# Patient Record
Sex: FEMALE | Race: WHITE | Employment: UNEMPLOYED | URBAN - METROPOLITAN AREA
[De-identification: names, ages, dates, MRNs, and addresses within clinical notes are randomized per-mention and may not be internally consistent; named-entity substitution may affect disease eponyms.]

---

## 2017-05-13 ENCOUNTER — HOSPITAL ENCOUNTER (EMERGENCY)
Age: 32
Discharge: HOME | End: 2017-05-14
Attending: EMERGENCY MEDICINE | Admitting: EMERGENCY MEDICINE
Payer: OTHER GOVERNMENT

## 2017-05-13 DIAGNOSIS — M79.18 LUMBAR MUSCLE PAIN: Primary | ICD-10-CM

## 2017-05-13 PROCEDURE — 96372 THER/PROPH/DIAG INJ SC/IM: CPT

## 2017-05-13 PROCEDURE — 99283 EMERGENCY DEPT VISIT LOW MDM: CPT

## 2017-05-14 ENCOUNTER — APPOINTMENT (OUTPATIENT)
Dept: GENERAL RADIOLOGY | Age: 32
End: 2017-05-14
Attending: EMERGENCY MEDICINE
Payer: OTHER GOVERNMENT

## 2017-05-14 VITALS
TEMPERATURE: 99 F | SYSTOLIC BLOOD PRESSURE: 125 MMHG | OXYGEN SATURATION: 99 % | RESPIRATION RATE: 14 BRPM | WEIGHT: 135 LBS | DIASTOLIC BLOOD PRESSURE: 75 MMHG | HEART RATE: 88 BPM

## 2017-05-14 LAB
ANION GAP BLD CALC-SCNC: 6 MMOL/L (ref 3–18)
BASOPHILS # BLD AUTO: 0 K/UL (ref 0–0.1)
BASOPHILS # BLD: 0 % (ref 0–2)
BUN SERPL-MCNC: 7 MG/DL (ref 7–18)
BUN/CREAT SERPL: 11 (ref 12–20)
CALCIUM SERPL-MCNC: 8.7 MG/DL (ref 8.5–10.1)
CHLORIDE SERPL-SCNC: 106 MMOL/L (ref 100–108)
CO2 SERPL-SCNC: 29 MMOL/L (ref 21–32)
CREAT SERPL-MCNC: 0.64 MG/DL (ref 0.6–1.3)
DIFFERENTIAL METHOD BLD: ABNORMAL
EOSINOPHIL # BLD: 0.1 K/UL (ref 0–0.4)
EOSINOPHIL NFR BLD: 1 % (ref 0–5)
ERYTHROCYTE [DISTWIDTH] IN BLOOD BY AUTOMATED COUNT: 12.3 % (ref 11.6–14.5)
GLUCOSE SERPL-MCNC: 100 MG/DL (ref 74–99)
HCT VFR BLD AUTO: 37.5 % (ref 35–45)
HGB BLD-MCNC: 12.8 G/DL (ref 12–16)
LYMPHOCYTES # BLD AUTO: 13 % (ref 21–52)
LYMPHOCYTES # BLD: 1.4 K/UL (ref 0.9–3.6)
MCH RBC QN AUTO: 30.9 PG (ref 24–34)
MCHC RBC AUTO-ENTMCNC: 34.1 G/DL (ref 31–37)
MCV RBC AUTO: 90.6 FL (ref 74–97)
MONOCYTES # BLD: 0.6 K/UL (ref 0.05–1.2)
MONOCYTES NFR BLD AUTO: 5 % (ref 3–10)
NEUTS SEG # BLD: 9.2 K/UL (ref 1.8–8)
NEUTS SEG NFR BLD AUTO: 81 % (ref 40–73)
PLATELET # BLD AUTO: 197 K/UL (ref 135–420)
PMV BLD AUTO: 11.6 FL (ref 9.2–11.8)
POTASSIUM SERPL-SCNC: 3.7 MMOL/L (ref 3.5–5.5)
RBC # BLD AUTO: 4.14 M/UL (ref 4.2–5.3)
SODIUM SERPL-SCNC: 141 MMOL/L (ref 136–145)
WBC # BLD AUTO: 11.3 K/UL (ref 4.6–13.2)

## 2017-05-14 PROCEDURE — 74011250637 HC RX REV CODE- 250/637: Performed by: EMERGENCY MEDICINE

## 2017-05-14 PROCEDURE — 74011250637 HC RX REV CODE- 250/637

## 2017-05-14 PROCEDURE — 85025 COMPLETE CBC W/AUTO DIFF WBC: CPT | Performed by: EMERGENCY MEDICINE

## 2017-05-14 PROCEDURE — 80048 BASIC METABOLIC PNL TOTAL CA: CPT | Performed by: EMERGENCY MEDICINE

## 2017-05-14 PROCEDURE — 72100 X-RAY EXAM L-S SPINE 2/3 VWS: CPT

## 2017-05-14 PROCEDURE — 74011250636 HC RX REV CODE- 250/636: Performed by: EMERGENCY MEDICINE

## 2017-05-14 RX ORDER — KETOROLAC TROMETHAMINE 30 MG/ML
30 INJECTION, SOLUTION INTRAMUSCULAR; INTRAVENOUS ONCE
Status: COMPLETED | OUTPATIENT
Start: 2017-05-14 | End: 2017-05-14

## 2017-05-14 RX ORDER — IBUPROFEN 800 MG/1
800 TABLET ORAL
Qty: 20 TAB | Refills: 0 | Status: SHIPPED | OUTPATIENT
Start: 2017-05-14 | End: 2017-05-21

## 2017-05-14 RX ORDER — METHOCARBAMOL 500 MG/1
TABLET, FILM COATED ORAL
Status: COMPLETED
Start: 2017-05-14 | End: 2017-05-14

## 2017-05-14 RX ORDER — METHOCARBAMOL 500 MG/1
750 TABLET, FILM COATED ORAL 4 TIMES DAILY
Status: DISCONTINUED | OUTPATIENT
Start: 2017-05-14 | End: 2017-05-14 | Stop reason: HOSPADM

## 2017-05-14 RX ORDER — METHOCARBAMOL 750 MG/1
750 TABLET, FILM COATED ORAL 3 TIMES DAILY
Qty: 15 TAB | Refills: 0 | Status: SHIPPED | OUTPATIENT
Start: 2017-05-14 | End: 2022-06-10

## 2017-05-14 RX ADMIN — KETOROLAC TROMETHAMINE 30 MG: 30 INJECTION, SOLUTION INTRAMUSCULAR at 00:49

## 2017-05-14 RX ADMIN — METHOCARBAMOL 750 MG: 500 TABLET ORAL at 02:07

## 2017-05-14 RX ADMIN — METHOCARBAMOL 750 MG: 500 TABLET ORAL at 02:06

## 2017-05-14 NOTE — ED TRIAGE NOTES
Pt states she was walking then all of a sudden she could not move.   Pt states severe lower back apin

## 2017-05-14 NOTE — ED PROVIDER NOTES
HPI Comments: 12:07 AM Rosalio Roman is a 36 y.o. female, who presents to the ED for the evaluation of back pain, onset around 0745 yesterday morning. States that while walking around her house, her back spasmed, followed by pain. Pt states that movement exacerbates her back pain. Reports h/o similar sx, but denies evaluation. Denies fever or chills. Reports taking Ibuprofen without relief. Denies smoking. No other complaints at this time. PCP: Not On File Bsi     The history is provided by the patient. History reviewed. No pertinent past medical history. History reviewed. No pertinent surgical history. History reviewed. No pertinent family history. Social History     Social History    Marital status: SINGLE     Spouse name: N/A    Number of children: N/A    Years of education: N/A     Occupational History    Not on file. Social History Main Topics    Smoking status: Former Smoker    Smokeless tobacco: Not on file    Alcohol use Not on file    Drug use: Not on file    Sexual activity: Not on file     Other Topics Concern    Not on file     Social History Narrative         ALLERGIES: Sudafed [pseudoephedrine hcl]    Review of Systems   Constitutional: Negative for chills and fever. HENT: Negative for congestion. Respiratory: Negative for cough and shortness of breath. Cardiovascular: Negative for chest pain. Gastrointestinal: Negative for abdominal pain and vomiting. Musculoskeletal: Positive for back pain. Skin: Negative for rash. Neurological: Negative for light-headedness. All other systems reviewed and are negative. Vitals:    05/13/17 2224   BP: 121/78   Pulse: 84   Resp: 16   Temp: 99.5 °F (37.5 °C)   SpO2: 100%   Weight: 61.2 kg (135 lb)        100% on RA, indicating adequate oxygenation. Physical Exam   Constitutional: She is oriented to person, place, and time. She appears well-developed and well-nourished.    HENT:   Head: Normocephalic and atraumatic. Right Ear: External ear normal.   Left Ear: External ear normal.   Nose: Nose normal.   Mouth/Throat: Oropharynx is clear and moist.   Eyes: Conjunctivae and EOM are normal. Pupils are equal, round, and reactive to light. Neck: Normal range of motion. Neck supple. Cardiovascular: Regular rhythm, normal heart sounds and intact distal pulses. Pulmonary/Chest: Effort normal and breath sounds normal. No respiratory distress. She has no wheezes. She has no rales. She exhibits no tenderness. Abdominal: Soft. Bowel sounds are normal. She exhibits no distension and no mass. There is no tenderness. There is no rebound and no guarding. Musculoskeletal: Normal range of motion. She exhibits no edema. Lumbar back: She exhibits tenderness. Tenderness around L5, aggravated by flexion and rotation   Neurological: She is alert and oriented to person, place, and time. Skin: Skin is warm and dry. No rash noted. No erythema. Psychiatric: She has a normal mood and affect. Her behavior is normal. Judgment normal.   Nursing note and vitals reviewed.        MDM  Number of Diagnoses or Management Options  Lumbar muscle pain:   Diagnosis management comments: Sudden low back pain aggravated by movement; suspect musculoskeletal origin Will treat , image, trend labs       Amount and/or Complexity of Data Reviewed  Clinical lab tests: ordered and reviewed  Tests in the radiology section of CPT®: ordered and reviewed    Risk of Complications, Morbidity, and/or Mortality  Presenting problems: low      ED Course       Procedures    Medications ordered:   Medications   ketorolac tromethamine (TORADOL) 60 mg/2 mL injection 30 mg (30 mg IntraMUSCular Given 5/14/17 0049)         Lab findings:  Recent Results (from the past 12 hour(s))   CBC WITH AUTOMATED DIFF    Collection Time: 05/14/17 12:30 AM   Result Value Ref Range    WBC 11.3 4.6 - 13.2 K/uL    RBC 4.14 (L) 4.20 - 5.30 M/uL    HGB 12.8 12.0 - 16.0 g/dL HCT 37.5 35.0 - 45.0 %    MCV 90.6 74.0 - 97.0 FL    MCH 30.9 24.0 - 34.0 PG    MCHC 34.1 31.0 - 37.0 g/dL    RDW 12.3 11.6 - 14.5 %    PLATELET 223 307 - 395 K/uL    MPV 11.6 9.2 - 11.8 FL    NEUTROPHILS 81 (H) 40 - 73 %    LYMPHOCYTES 13 (L) 21 - 52 %    MONOCYTES 5 3 - 10 %    EOSINOPHILS 1 0 - 5 %    BASOPHILS 0 0 - 2 %    ABS. NEUTROPHILS 9.2 (H) 1.8 - 8.0 K/UL    ABS. LYMPHOCYTES 1.4 0.9 - 3.6 K/UL    ABS. MONOCYTES 0.6 0.05 - 1.2 K/UL    ABS. EOSINOPHILS 0.1 0.0 - 0.4 K/UL    ABS. BASOPHILS 0.0 0.0 - 0.1 K/UL    DF AUTOMATED     METABOLIC PANEL, BASIC    Collection Time: 05/14/17 12:30 AM   Result Value Ref Range    Sodium 141 136 - 145 mmol/L    Potassium 3.7 3.5 - 5.5 mmol/L    Chloride 106 100 - 108 mmol/L    CO2 29 21 - 32 mmol/L    Anion gap 6 3.0 - 18 mmol/L    Glucose 100 (H) 74 - 99 mg/dL    BUN 7 7.0 - 18 MG/DL    Creatinine 0.64 0.6 - 1.3 MG/DL    BUN/Creatinine ratio 11 (L) 12 - 20      GFR est AA >60 >60 ml/min/1.73m2    GFR est non-AA >60 >60 ml/min/1.73m2    Calcium 8.7 8.5 - 10.1 MG/DL       X-Ray, CT or other radiology findings or impressions:  No results found. Spine x ray per Elyssa Eugene MD : No acute process     Progress notes, Consult notes or additional Procedure notes:   1:39 AM: I have reassessed the patient and discussed their results and diagnosis. Pt will be discharged in stable condition. Patient understands and verbalizes agreement with plan. Reevaluation of patient:   I have reevaluated patient. Patient is feeling better. Dispo:  Patient was discharged home in stable condition. Patient is to return to emergency department with any new or worsening condition. 1. Lumbar muscle pain        Follow-up Information     Follow up With Details Comments Contact Info    Not On File Bsi Call in 2 days  Not On File (58) Patient has a PCP but that physician is not listed in [address] [place] [address].       [facility] EMERGENCY DEPT  As needed, If symptoms worsen [address] [facility] [address]  [address] [facility] Call in 2 days  [facility] [address]  [phone]          Patient's Medications   Start Taking    IBUPROFEN (MOTRIN) 800 MG TABLET    Take 1 Tab by mouth every eight (8) hours as needed for Pain for up to 7 days. METHOCARBAMOL (ROBAXIN-750) 750 MG TABLET    Take 1 Tab by mouth three (3) times daily. Continue Taking    No medications on file   These Medications have changed    No medications on file   Stop Taking    No medications on file       SCRIBE ATTESTATION STATEMENT  Documented by: Sherren Erb. [employer] for, and in the presence of, Daniel Powell MD 12:07 AM     Signed by: Sherren Erb. [address], 5/14/2017 12:07 AM     PROVIDER ATTESTATION STATEMENT  I personally performed the services described in the documentation, reviewed the documentation, as recorded by the scribe in my presence, and it accurately and completely records my words and actions.   Daniel Powell MD

## 2017-05-14 NOTE — ED NOTES
Pt states lower back pain started suddenly when she was walking around the house, states she became unable to walk and \"her back seized up\", pt had this about 7 months ago after birth of child but only lasted an hour, this time it has not improved. Slight TTP on vertebrae and left lower back, denies pain down the back of the leg or tingling. Pt denies any other symptoms. Pt has no medical problems except seasonal allergies, taes nasal spray, otherwise no meds, no recent illnesses or fevers. Pt tried ibuprofin at home with no relief.  No issues with voiding or stool

## 2017-05-16 ENCOUNTER — HOSPITAL ENCOUNTER (OUTPATIENT)
Dept: GENERAL RADIOLOGY | Age: 32
Discharge: HOME | End: 2017-05-16
Payer: SELF-PAY

## 2017-05-16 DIAGNOSIS — J40 BRONCHITIS: ICD-10-CM

## 2017-05-16 PROCEDURE — 71020 XR CHEST PA LAT: CPT

## 2017-06-08 ENCOUNTER — OFFICE VISIT (OUTPATIENT)
Dept: ORTHOPEDIC SURGERY | Age: 32
End: 2017-06-08

## 2017-06-08 VITALS
TEMPERATURE: 98.2 F | WEIGHT: 137 LBS | OXYGEN SATURATION: 98 % | HEART RATE: 68 BPM | SYSTOLIC BLOOD PRESSURE: 102 MMHG | DIASTOLIC BLOOD PRESSURE: 72 MMHG | RESPIRATION RATE: 18 BRPM

## 2017-06-08 DIAGNOSIS — M62.830 LUMBAR PARASPINAL MUSCLE SPASM: Primary | ICD-10-CM

## 2017-06-08 RX ORDER — GABAPENTIN 100 MG/1
100 CAPSULE ORAL
Qty: 90 CAP | Refills: 1 | Status: SHIPPED | OUTPATIENT
Start: 2017-06-08 | End: 2022-06-10

## 2017-06-08 RX ORDER — FLUTICASONE PROPIONATE 50 MCG
2 SPRAY, SUSPENSION (ML) NASAL DAILY
COMMUNITY
Start: 2017-05-12 | End: 2022-06-10

## 2017-06-08 RX ORDER — LORATADINE 10 MG/1
10 TABLET ORAL
COMMUNITY
Start: 2017-05-16 | End: 2022-06-10

## 2017-06-08 RX ORDER — ALBUTEROL SULFATE 90 UG/1
POWDER, METERED RESPIRATORY (INHALATION)
COMMUNITY
Start: 2017-05-16 | End: 2022-06-10

## 2017-06-08 NOTE — PROGRESS NOTES
José Miguelsherlynfausto Michellegermaine [facility] 2.  [address], [address]  [facility], [address]  Phone: [phone]  Fax: [phone]        Sultana Martinez  : 1985  PCP: Leola Narayan MD      NEW PATIENT      Tony Perez 89 was seen today for back pain. Diagnoses and all orders for this visit:    Lumbar paraspinal muscle spasm    Other orders  -     gabapentin (NEURONTIN) 100 mg capsule; Take 1 Cap by mouth three (3) times daily as needed. 1. Trial of Gabapentin. 2. Referral to physical therapy. 3. Advised to avoid repetitive bending, twisting, and lifting. 4. Given care instructions for injury prevention. Follow-up Disposition:  Return in about 6 weeks (around 2017) for PT f/u, med f/u, adjustment. CHIEF COMPLAINT  Libertad Alejandre is seen today in consultation as a self referral for complaints of back pain since 17. HISTORY OF PRESENT ILLNESS  Libertad Alejandre is a 36 y.o. female  approx 9 month post-partum. Pt reports that she was walking into the kitchen when her back locked up on her. The first time she felt this in her back was after her  in 2016 when she was sitting on the couch and her back locked up on her. She notes that she went to the ED as she wanted to know what is going on in her back to cause this flared pain. She notes that she has \"crunching\" in her back as well. No rashes or infections. She denies any radiating pain or paresthesia in her BLE. She does have swelling in her back intermittently. She states that 17 her pain was so severe that she was unable to walk. She was given Robaxin and Ibuprofen from the ED which did not give her any benefit. She notes that with prolonged standing and activity her back will begin to bother her. Five days ago she had increased swelling in her back that caused her to be unable to walk or stand. She had to call her mother to help her.  Her  is in [place] for school right now so she has to rely on her mother if she is unable to do activities due to her back. Pt states that for the most part, she sleeps well at night. No saddle paresthesia. Denies persistent fevers, chills, weight changes, neurogenic bowel or bladder symptoms. Pt denies recent hospitalizations. She last worked in 2017. Pt has a baby boy, Jessika Nguyen, 6 months old at home. No family hx of spinal maladies. Xrays reviewed w/pt. - loss of normal lordosis. No flowsheet data found. XR Results:    Results from Hospital Encounter encounter on 17   XR SPINE LUMB 2 OR 3 V   Narrative LUMBAR SPINE AP AND LATERAL    CPT CODE: 07459    INDICATIONS: Pain. Findings:  AP and lateral views of the lumbar spine are submitted. Vertebral  body heights and disc spaces are well-maintained. There is no fracture or  subluxation. Pedicles are normal.         Impression Impression:    Normal study                  PAST MEDICAL HISTORY   Past Medical History:   Diagnosis Date    Asthma        Past Surgical History:   Procedure Laterality Date    HX  SECTION  2016       MEDICATIONS      Current Outpatient Prescriptions   Medication Sig Dispense Refill    PROAIR RESPICLICK 90 mcg/actuation aepb       fluticasone (FLONASE) 50 mcg/actuation nasal spray 2 Sprays by Both Nostrils route daily.  loratadine (CLARITIN) 10 mg tablet Take 10 mg by mouth.  gabapentin (NEURONTIN) 100 mg capsule Take 1 Cap by mouth three (3) times daily as needed. 90 Cap 1    methocarbamol (ROBAXIN-750) 750 mg tablet Take 1 Tab by mouth three (3) times daily. 15 Tab 0       ALLERGIES    Allergies   Allergen Reactions    Sudafed [Pseudoephedrine Hcl] Hives          SOCIAL HISTORY    Social History     Social History    Marital status:      Spouse name: N/A    Number of children: N/A    Years of education: N/A     Occupational History    Not on file.      Social History Main Topics    Smoking status: Former Smoker    Smokeless tobacco: Not on file    Alcohol use Yes    Drug use: Not on file    Sexual activity: Not on file     Other Topics Concern    Not on file     Social History Narrative       FAMILY HISTORY    Family History   Problem Relation Age of Onset    Hypertension Mother     Hypertension Father          REVIEW OF SYSTEMS  Review of Systems   Constitutional: Negative for chills, fever and weight loss. Respiratory: Negative for shortness of breath. Cardiovascular: Negative for chest pain. Gastrointestinal: Negative for constipation. Negative for fecal incontinence   Genitourinary: Negative for dysuria. Negative for urinary incontinence   Musculoskeletal:        Per HPI   Skin: Negative for rash. Neurological: Negative for dizziness, tingling, tremors, focal weakness and headaches. Endo/Heme/Allergies: Does not bruise/bleed easily. Psychiatric/Behavioral: The patient does not have insomnia. PHYSICAL EXAMINATION  Visit Vitals    /72    Pulse 68    Temp 98.2 °F (36.8 °C) (Oral)    Resp 18    Wt 137 lb (62.1 kg)    LMP 04/26/2017    SpO2 98%          Accompanied by self. Constitutional:  Well developed, well nourished, in no acute distress. Psychiatric: Affect and mood are appropriate. Integumentary: No rashes or abrasions noted on exposed areas. Cardiovascular/Peripheral Vascular: Intact l pulses. No peripheral edema is noted. Lymphatic:  No evidence of lymphedema. No cervical lymphadenopathy. SPINE/MUSCULOSKELETAL EXAM     Elevation of RT scapula. Lumbar spine:  No rash, ecchymosis, or gross obliquity. No fasciculations. No focal atrophy is noted. Tenderness to palpation of bilateral paraspinals L4 to the sacrum. No tenderness to palpation at the sciatic notch. SI joints non-tender. Trochanters non tender. Sensation grossly intact to light touch.       MOTOR:       Hip Flex  Quads Hamstrings Ankle DF EHL Ankle PF   Right +4/5 +4/5 +4/5 +4/5 +4/5 +4/5   Left +4/5 +4/5 +4/5 +4/5 +4/5 +4/5     Straight Leg raise negative. No difficulty with tandem gait. Heel walk intact. Toe rise intact. Squat elicits back pain. Ambulation without assistive device. FWB. DTRs 2+ LEs. Written by Deanna Morgan, as dictated by Monserrat Rosa MD.    I, Dr. Monserrat Rosa MD, confirm that all documentation is accurate. Ms. Mikhail Huffman may have a reminder for a \"due or due soon\" health maintenance. I have asked that she contact her primary care provider for follow-up on this health maintenance.

## 2017-06-08 NOTE — PATIENT INSTRUCTIONS
Pronota Activation    Thank you for requesting access to Pronota. Please follow the instructions below to securely access and download your online medical record. Pronota allows you to send messages to your doctor, view your test results, renew your prescriptions, schedule appointments, and more. How Do I Sign Up? 1. In your internet browser, go to [email or web address]  2. Click on the First Time User? Click Here link in the Sign In box. You will be redirect to the New Member Sign Up page. 3. Enter your Pronota Access Code exactly as it appears below. You will not need to use this code after youve completed the sign-up process. If you do not sign up before the expiration date, you must request a new code. Pronota Access Code: [email or web address]  Expires: 2017  1:42 AM (This is the date your Pronota access code will )    4. Enter the last four digits of your Social Security Number (xxxx) and Date of Birth (mm/dd/yyyy) as indicated and click Submit. You will be taken to the next sign-up page. 5. Create a Pronota ID. This will be your Pronota login ID and cannot be changed, so think of one that is secure and easy to remember. 6. Create a Pronota password. You can change your password at any time. 7. Enter your Password Reset Question and Answer. This can be used at a later time if you forget your password. 8. Enter your e-mail address. You will receive e-mail notification when new information is available in [address]. 9. Click Sign Up. You can now view and download portions of your medical record. 10. Click the Download Summary menu link to download a portable copy of your medical information. Additional Information    If you have questions, please visit the Frequently Asked Questions section of the Pronota website at [email or web address] Remember, Pronota is NOT to be used for urgent needs. For medical emergencies, dial 911.          Back Care and Preventing Injuries: Care Instructions  Your Care Instructions  You can hurt your back doing many everyday activities: lifting a heavy box, bending down to garden, exercising at the gym, and even getting out of bed. But you can keep your back strong and healthy by doing some exercises. You also can follow a few tips for sitting, sleeping, and lifting to avoid hurting your back again. Talk to your doctor before you start an exercise program. Ask for help if you want to learn more about keeping your back healthy. Follow-up care is a key part of your treatment and safety. Be sure to make and go to all appointments, and call your doctor if you are having problems. It's also a good idea to know your test results and keep a list of the medicines you take. How can you care for yourself at home? · Stay at a healthy weight to avoid strain on your lower back. · Do not smoke. Smoking increases the risk of osteoporosis, which weakens the spine. If you need help quitting, talk to your doctor about stop-smoking programs and medicines. These can increase your chances of quitting for good. · Make sure you sleep in a position that maintains your back's normal curves and on a mattress that feels comfortable. Sleep on your side with a pillow between your knees, or sleep on your back with a pillow under your knees. These positions can reduce strain on your back. · When you get out of bed, lie on your side and bend both knees. Drop your feet over the edge of the bed as you push up with both arms. Scoot to the edge of the bed. Make sure your feet are in line with your rear end (buttocks), and then stand up. · If you must stand for a long time, put one foot on a stool, ledge, or box. Exercise to strengthen your back and other muscles  · Get at least 30 minutes of exercise on most days of the week. Walking is a good choice.  You also may want to do other activities, such as running, swimming, cycling, or playing tennis or team sports. · Stretch your back muscles. Here are few exercises to try:  [place] on your back with your knees bent and your feet flat on the floor. Gently pull one bent knee to your chest. Put that foot back on the floor, and then pull the other knee to your chest. Hold for 15 to 30 seconds. Repeat 2 to 4 times. ¨ Do pelvic tilts. Lie on your back with your knees bent. Tighten your stomach muscles. Pull your belly button (navel) in and up toward your ribs. You should feel like your back is pressing to the floor and your hips and pelvis are slightly lifting off the floor. Hold for 6 seconds while breathing smoothly. · Keep your core muscles strong. The muscles of your back, belly (abdomen), and buttocks support your spine. ¨ Pull in your belly, and imagine pulling your navel toward your spine. Hold this for 6 seconds, then relax. Remember to keep breathing normally as you tense your muscles. ¨ Do curl-ups. Always do them with your knees bent. Keep your low back on the floor, and curl your shoulders toward your knees using a smooth, slow motion. Keep your arms folded across your chest. If this bothers your neck, try putting your hands behind your neck (not your head), with your elbows spread apart. ¨ Lie on your back with your knees bent and your feet flat on the floor. Tighten your belly muscles, and then push with your feet and raise your buttocks up a few inches. Hold this position 6 seconds as you continue to breathe normally, then lower yourself slowly to the floor. Repeat 8 to 12 times. ¨ If you like group exercise, try Pilates or yoga. These classes have poses that strengthen the core muscles. Protect your back when you sit  · Place a small pillow, a rolled-up towel, or a lumbar roll in the curve of your back if you need extra support. · Sit in a chair that is low enough to let you place both feet flat on the floor with both knees nearly level with your hips.  If your chair or desk is too high, use a foot rest to raise your knees. · When driving, keep your knees nearly level with your hips. Sit straight, and drive with both hands on the steering wheel. Your arms should be in a slightly bent position. · Try a kneeling chair, which helps tilt your hips forward. This takes pressure off your lower back. · Try sitting on an exercise ball. It can rock from side to side, which helps keep your back loose. Lift properly  · Squat down, bending at the hips and knees only. If you need to, put one knee to the floor and extend your other knee in front of you, bent at a right angle (half kneeling). · Press your chest straight forward. This helps keep your upper back straight while keeping a slight arch in your low back. · Hold the load as close to your body as possible, at the level of your navel. · Use your feet to change direction, taking small steps. · Lead with your hips as you change direction. Keep your shoulders in line with your hips as you move. Do not twist your body. · Set down your load carefully, squatting with your knees and hips only. When should you call for help? Watch closely for changes in your health, and be sure to contact your doctor if:  · You want more exercises to make your back and other core muscles stronger. Where can you learn more? Go to http://sarah-rabia.info/. Enter S810 in the search box to learn more about \"Back Care and Preventing Injuries: Care Instructions. \"  Current as of: May 23, 2016  Content Version: 11.2  © 1218-5904 [employer]. Care instructions adapted under license by [employer] (which disclaims liability or warranty for this information). If you have questions about a medical condition or this instruction, always ask your healthcare professional. [place] [address] any warranty or liability for your use of this information.

## 2017-06-08 NOTE — PROGRESS NOTES
Verbal order entered per Dr. Traylor English as documented on blue sheet:Gabapentin 100mg take 1 tab PO TID prn pain.  Disp 90 with 1 refill

## 2017-06-09 ENCOUNTER — HOSPITAL ENCOUNTER (OUTPATIENT)
Dept: PHYSICAL THERAPY | Age: 32
Discharge: HOME | End: 2017-06-09
Payer: OTHER GOVERNMENT

## 2017-06-09 PROCEDURE — 97140 MANUAL THERAPY 1/> REGIONS: CPT

## 2017-06-09 PROCEDURE — 97112 NEUROMUSCULAR REEDUCATION: CPT

## 2017-06-09 PROCEDURE — 97162 PT EVAL MOD COMPLEX 30 MIN: CPT

## 2017-06-09 NOTE — PROGRESS NOTES
[facility] 45  [address] [facility] [address], [address] [phone] fax    Plan of Care/ Statement of Necessity for Physical Therapy Services           Patient name: Cherylene Craft Start of Care: 2017   Referral source: Inge Roland MD : 1985    Medical Diagnosis: Low back pain [M54.5]   Onset Date:May 2017, Oct 2016    Treatment Diagnosis: low back pain   Prior Hospitalization: see medical history Provider#: [id number]   Medications: Verified on Patient summary List    Comorbidities:  10/16   Prior Level of Function: Active andhealthy, chronic left UQ pain and spasms; used to ienjoy cross fit, run/jog/hike yard work. Recent increase stress with house repairs    The Plan of Care and following information is based on the information from the initial evaluation. Assessment/ key information: Patient is a 36 y. o.female presenting with Low back pain [M54.5]. Mrs. Skip Gramajo reports onset low back pain in October one month after , which was severe but self resolved. Saw return of severe pain in May with out particular provocation, saw in ER, PCP and spine center with diagnosis of muscular origin. 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 She lacks end range flexion with soft tissue restrictions, ext noted with decreased lumbar facet mobility. We will work to modulate pain and irritation to allow full return of trunk mobility and flexibility to allow return hip and core strength    Patient will benefit from skilled PT services to address deficits and facilitate return to premorbid activity level and promote improved quality of life.        Evaluation Complexity History MEDIUM  Complexity : 1-2 comorbidities / personal factors will impact the outcome/ POC ; Examination MEDIUM Complexity : 3 Standardized tests and measures addressing body structure, function, activity limitation and / or participation in recreation  ;Presentation MEDIUM Complexity : Evolving with changing characteristics  ; Clinical Decision Making MEDIUM Complexity : FOTO score of 26-74  Overall Complexity Rating: MEDIUM  Problem List: pain affecting function, decrease ROM, decrease strength, impaired gait/ balance, decrease ADL/ functional abilitiies, decrease activity tolerance, decrease flexibility/ joint mobility and decrease transfer abilities   Treatment Plan may include any combination of the following: Therapeutic exercise, Therapeutic activities, Neuromuscular re-education, Physical agent/modality, Gait/balance training, Manual therapy, Patient education, Self Care training, Functional mobility training, Home safety training and Stair training  Patient / Family readiness to learn indicated by: asking questions, trying to perform skills and interest  Persons(s) to be included in education: patient (P)  Barriers to Learning/Limitations: None  Patient Goal (s): hopefully to fix the issue  Patient Self Reported Health Status: good  Rehabilitation Potential: good  Short Term Goals: To be accomplished in 1 weeks:  1. Establish and comply with HEP to include relaxation breathing for minimizing abdominal compensations, TA activation without strain    Long Term Goals: To be accomplished in 8 treatments:  1. Pt will demonstrate understanding/compliance with final HEP in order to continue to improve independently upon DC   (Status at evaluation: na)   2. Patient will Increase FOTO score to 74 points to demonstrate clinical significant increase allowing increased functional mobility within the home and community. (Status at evaluation: 54)   3.  Patient will report  No pain with mobility, <3/10 pain with higher level activity to include  to increase functional activity tolerance  (Status at evaluation: pain with sit to stand, bed mobiilty )   4. Patient will report >30 min standing, sitting and walking tolerance absent of pain to facilitate return to premorbid activity level  (Status at evaluation: 15-20)     Frequency / Duration: Patient to be seen 2 times per week for 8 treatments. Patient/ Caregiver education and instruction: Diagnosis, prognosis, self care, activity modification and exercises   [x]  Plan of care has been reviewed with RODRIGUE Lindsey, PT 6/9/2017 9:30 AM    ________________________________________________________________________    I certify that the above Therapy Services are being furnished while the patient is under my care. I agree with the treatment plan and certify that this therapy is necessary.     Physician's Signature:____________________  Date:____________Time: _________    Please sign and return to [facility] 45  [address] [place], [address] [phone] fax

## 2017-06-09 NOTE — PROGRESS NOTES
PT DAILY TREATMENT NOTE 8-14    Patient Name: Maddi Ramirez  Date:2017  : 1985  [x]  Patient  Verified  Payor:  / Plan: [facility] [address] AND DEPENDENTS / Product Type:  /    In time:930  Out time:1028  Total Treatment Time (min): 62  Visit #: 1 of 8    Treatment Area: Low back pain [M54.5]    SUBJECTIVE  Pain Level (0-10 scale): 1  Any medication changes, allergies to medications, adverse drug reactions, diagnosis change, or new procedure performed?: [x] No    [] Yes (see summary sheet for update)  Subjective functional status/changes:   [x] See Eval form in paper chart     OBJECTIVE      30 min [x]Eval                  []Re-Eval         15 min Neuromuscular Re-education:  [x]  See HEP  sheet :   Rationale: increase ROM, increase strength, improve coordination and increase proprioception  to improve the patients ability to regain breath pattern to allow core activation to minimize lumbar strain for  Standing, mobility     13 min Manual Therapy:  MFR thoracic diaphragm, pelvic diaphragm  Trunk roll for right ant innominate correction, thoracic and lumbar mobiilty   Rationale: decrease pain, increase ROM, increase tissue extensibility, decrease trigger points and increase postural awareness to regain core stability for mobility, positional tolerance             With   [x] TE   [] TA   [x] neuro   [] other: Patient Education: [x] Review HEP    [] Progressed/Changed HEP based on:   [x] positioning   [x] body mechanics   [] transfers   [x] heat/ice application    [] other:           Pain Level (0-10 scale) post treatment: 1    ASSESSMENT:   [x]  See Evaluation          Goals:  Short Term Goals: To be accomplished in 1 weeks:  1. Establish and comply with HEP to include relaxation breathing for minimizing abdominal compensations, TA activation without strain    Long Term Goals: To be accomplished in 8 treatments:  1.  Pt will demonstrate understanding/compliance with final HEP in order to continue to improve independently upon DC   (Status at evaluation: na)   2. Patient will Increase FOTO score to 74 points to demonstrate clinical significant increase allowing increased functional mobility within the home and community. (Status at evaluation: 54)   3. Patient will report  No pain with mobility, <3/10 pain with higher level activity to include  to increase functional activity tolerance  (Status at evaluation: pain with sit to stand, bed mobiilty )   4.  Patient will report >30 min standing, sitting and walking tolerance absent of pain to facilitate return to premorbid activity level  (Status at evaluation: 15-20)     PLAN      [x]  Continue plan of care           Margarita Kaur, PT 6/9/2017  9:30 AM

## 2017-06-14 ENCOUNTER — HOSPITAL ENCOUNTER (OUTPATIENT)
Dept: PHYSICAL THERAPY | Age: 32
Discharge: HOME | End: 2017-06-14
Payer: OTHER GOVERNMENT

## 2017-06-14 PROCEDURE — 97110 THERAPEUTIC EXERCISES: CPT

## 2017-06-14 PROCEDURE — 97112 NEUROMUSCULAR REEDUCATION: CPT

## 2017-06-14 NOTE — PROGRESS NOTES
PT DAILY TREATMENT NOTE 12    Patient Name: Karin Thompson  Date:2017  : 1985  [x]  Patient  Verified  Payor:  / Plan: [facility]  ACTIVE DUTY AND DEPENDENTS / Product Type:  /    In time:938  Out time:1034  Total Treatment Time (min): 64  Visit #: 2 of 8    Treatment Area: Low back pain [M54.5]    SUBJECTIVE  Pain Level (0-10 scale): 1-2  Any medication changes, allergies to medications, adverse drug reactions, diagnosis change, or new procedure performed?: [x] No    [] Yes (see summary sheet for update)  Subjective functional status/changes:   [] No changes reported  \"I'm not too bad right this second. \"    OBJECTIVE    Modality rationale: PD   Min Type Additional Details    [] Estim:  []Unatt       []IFC  []Premod                        []Other:  []w/ice   []w/heat  Position:  Location:    [] Estim: []Att    []TENS instruct  []NMES                    []Other:  []w/US   []w/ice   []w/heat  Position:  Location:    []  Traction: [] Cervical       []Lumbar                       [] Prone          []Supine                       []Intermittent   []Continuous Lbs:  [] before manual  [] after manual    []  Ultrasound: []Continuous   [] Pulsed                           []1MHz   []3MHz W/cm2:  Location:    []  Iontophoresis with dexamethasone         Location: [] Take home patch   [] In clinic    []  Ice     []  heat  []  Ice massage  []  Laser   []  Anodyne Position:  Location:    []  Laser with stim  []  Other:  Position:  Location:    []  Vasopneumatic Device Pressure:       [] lo [] med [] hi   Temperature: [] lo [] med [] hi   [] Skin assessment post-treatment:  []intact []redness- no adverse reaction    []redness  adverse reaction:     23 min Therapeutic Exercise:  [x] See flow sheet :   Rationale: increase ROM, increase strength and improve coordination to improve the patients ability to improev ease with mobility.      23 min Neuromuscular Re-education:  [x]  See flow sheet :TA draw and core stability    Rationale: increase ROM, increase strength, improve coordination, improve balance and increase proprioception  to improve the patients ability to maintain neutral core & upright           With   [] TE   [] TA   [] neuro   [] other: Patient Education: [x] Review HEP    [] Progressed/Changed HEP based on:   [] positioning   [] body mechanics   [] transfers   [] heat/ice application    [] other:      Other Objective/Functional Measures:      Pain Level (0-10 scale) post treatment: 0    ASSESSMENT/Changes in Function: Ms. Edie Hoffman needs some cuing for breathing with TA control activities, but otherwise tolerated exercises well. Patient will continue to benefit from skilled PT services to modify and progress therapeutic interventions, address functional mobility deficits, address ROM deficits, address strength deficits, analyze and address soft tissue restrictions, analyze and cue movement patterns, analyze and modify body mechanics/ergonomics, assess and modify postural abnormalities, address imbalance/dizziness and instruct in home and community integration to attain remaining goals. []  See Plan of Care  []  See progress note/recertification  []  See Discharge Summary         Progress towards goals / Updated goals:  Short Term Goals: To be accomplished in 1 weeks:  1. Establish and comply with HEP to include relaxation breathing for minimizing abdominal compensations, TA activation without strain     Long Term Goals: To be accomplished in 8 treatments:  1. Pt will demonstrate understanding/compliance with final HEP in order to continue to improve independently upon DC   (Status at evaluation: na)   2. Patient will Increase FOTO score to 74 points to demonstrate clinical significant increase allowing increased functional mobility within the home and community. (Status at evaluation: 54)   3.  Patient will report No pain with mobility, <3/10 pain with higher level activity to include  to increase functional activity tolerance  (Status at evaluation: pain with sit to stand, bed mobiilty )   4.  Patient will report >30 min standing, sitting and walking tolerance absent of pain to facilitate return to premorbid activity level  (Status at evaluation: 15-20)        PLAN  []  Upgrade activities as tolerated     [x]  Continue plan of care  []  Update interventions per flow sheet       []  Discharge due to:_  []  Other:_      Jose Naik PT 6/14/2017  10:36 AM    Future Appointments  Date Time Provider Debra Hammer   6/16/2017 10:00 AM Alyx Pitts

## 2017-06-16 ENCOUNTER — HOSPITAL ENCOUNTER (OUTPATIENT)
Dept: PHYSICAL THERAPY | Age: 32
Discharge: HOME | End: 2017-06-16
Payer: OTHER GOVERNMENT

## 2017-06-16 PROCEDURE — 97110 THERAPEUTIC EXERCISES: CPT

## 2017-06-16 PROCEDURE — 97112 NEUROMUSCULAR REEDUCATION: CPT

## 2017-06-16 NOTE — PROGRESS NOTES
PT DAILY TREATMENT NOTE     Patient Name: Azael Ritchie  Date:2017  : 1985  [x]  Patient  Verified  Payor:  / Plan: [employer] [facility] [address] AND DEPENDENTS / Product Type:  /    In time:1005  Out time:1103  Total Treatment Time (min): 58  Visit #: 3 of 8    Treatment Area: Low back pain [M54.5]    SUBJECTIVE  Pain Level (0-10 scale): 0  Any medication changes, allergies to medications, adverse drug reactions, diagnosis change, or new procedure performed?: [x] No    [] Yes (see summary sheet for update)  Subjective functional status/changes:   [] No changes reported  \"I'm doing ok today. \"    OBJECTIVE    Modality rationale: decrease pain and increase tissue extensibility to improve the patients ability to perform ADLs.     Min Type Additional Details    [] Estim:  []Unatt       []IFC  []Premod                        []Other:  []w/ice   []w/heat  Position:  Location:    [] Estim: []Att    []TENS instruct  []NMES                    []Other:  []w/US   []w/ice   []w/heat  Position:  Location:    []  Traction: [] Cervical       []Lumbar                       [] Prone          []Supine                       []Intermittent   []Continuous Lbs:  [] before manual  [] after manual    []  Ultrasound: []Continuous   [] Pulsed                           []1MHz   []3MHz W/cm2:  Location:    []  Iontophoresis with dexamethasone         Location: [] Take home patch   [] In clinic   10 []  Ice     [x]  heat  []  Ice massage  []  Laser   []  Anodyne Position:spine with legs on wedge  Location:low back    []  Laser with stim  []  Other:  Position:  Location:    []  Vasopneumatic Device Pressure:       [] lo [] med [] hi   Temperature: [] lo [] med [] hi   [x] Skin assessment post-treatment:  [x]intact []redness- no adverse reaction    []redness  adverse reaction:       25 min Therapeutic Exercise:  [x] See flow sheet :   Rationale: increase ROM, increase strength and improve coordination to improve the patients ability to perform ADLs. 23 min Neuromuscular Re-education:  [x]  See flow sheet :core re-ed activities progressed   Rationale: increase ROM, increase strength, improve coordination, improve balance and increase proprioception  to improve the patients ability to maintain neutral posturing/ lift & squat. With   [] TE   [] TA   [] neuro   [] other: Patient Education: [x] Review HEP    [] Progressed/Changed HEP based on:   [] positioning   [] body mechanics   [] transfers   [] heat/ice application    [] other:      Other Objective/Functional Measures:      Pain Level (0-10 scale) post treatment: 0    ASSESSMENT/Changes in Function: Ms. Raquel Ramirez did well with progression of core stabilization activities to quadruped positioning, needs some cuing to facilitate pelvic mobility. Patient will continue to benefit from skilled PT services to modify and progress therapeutic interventions, address functional mobility deficits, address ROM deficits, address strength deficits, analyze and address soft tissue restrictions, analyze and cue movement patterns, analyze and modify body mechanics/ergonomics, assess and modify postural abnormalities, address imbalance/dizziness and instruct in home and community integration to attain remaining goals. []  See Plan of Care  []  See progress note/recertification  []  See Discharge Summary         Progress towards goals / Updated goals:  Short Term Goals: To be accomplished in 1 weeks:  1. Establish and comply with HEP to include relaxation breathing for minimizing abdominal compensations, TA activation without strain   PROGRESSING: reports compliance      Long Term Goals: To be accomplished in 8 treatments:  1. Pt will demonstrate understanding/compliance with final HEP in order to continue to improve independently upon DC    (Status at evaluation: na)    Reports compliance  2.  Patient will Increase FOTO score to 74 points to demonstrate clinical significant increase allowing increased functional mobility within the home and community. (Status at evaluation: 47)    Reassess at 4th visit  3. Patient will report No pain with mobility, <3/10 pain with higher level activity to include  to increase functional activity tolerance   (Status at evaluation: pain with sit to stand, bed mobiilty )    Pain 0-1/10 for past 2 visits  4. Patient will report >30 min standing, sitting and walking tolerance absent of pain to facilitate return to premorbid activity level   (Status at evaluation: 15-20)   Not yet formally reassessed        PLAN  []  Upgrade activities as tolerated     [x]  Continue plan of care  []  Update interventions per flow sheet       []  Discharge due to:_  []  Other:_      Emiliano Green, PT 6/16/2017  10:27 AM    No future appointments.

## 2017-06-20 ENCOUNTER — HOSPITAL ENCOUNTER (OUTPATIENT)
Dept: PHYSICAL THERAPY | Age: 32
Discharge: HOME | End: 2017-06-20
Payer: OTHER GOVERNMENT

## 2017-06-20 PROCEDURE — 97110 THERAPEUTIC EXERCISES: CPT

## 2017-06-20 PROCEDURE — 97112 NEUROMUSCULAR REEDUCATION: CPT

## 2017-06-20 NOTE — PROGRESS NOTES
PT DAILY TREATMENT NOTE 12    Patient Name: Lydia Byrne  Date:2017  : 1985  [x]  Patient  Verified  Payor:  / Plan: [facility]  ACTIVE DUTY AND DEPENDENTS / Product Type: [employer] /    In time:1130  Out time:1230  Total Treatment Time (min): 60  Visit #: 4 of 8    Treatment Area: Low back pain [M54.5]    SUBJECTIVE  Pain Level (0-10 scale): 2-3  Any medication changes, allergies to medications, adverse drug reactions, diagnosis change, or new procedure performed?: [x] No    [] Yes (see summary sheet for update)  Subjective functional status/changes:   [] No changes reported  \"I had a rough night and did get much sleep because of the baby. \"    OBJECTIVE    Modality rationale: decrease pain and increase tissue extensibility to improve the patients ability to improve ease with mobility.    Min Type Additional Details    [] Estim:  []Unatt       []IFC  []Premod                        []Other:  []w/ice   []w/heat  Position:  Location:    [] Estim: []Att    []TENS instruct  []NMES                    []Other:  []w/US   []w/ice   []w/heat  Position:  Location:    []  Traction: [] Cervical       []Lumbar                       [] Prone          []Supine                       []Intermittent   []Continuous Lbs:  [] before manual  [] after manual    []  Ultrasound: []Continuous   [] Pulsed                           []1MHz   []3MHz W/cm2:  Location:    []  Iontophoresis with dexamethasone         Location: [] Take home patch   [] In clinic   10 []  Ice     [x]  heat  []  Ice massage  []  Laser   []  Anodyne Position:supine with legs elevated on wedge  Location:low back    []  Laser with stim  []  Other:  Position:  Location:    []  Vasopneumatic Device Pressure:       [] lo [] med [] hi   Temperature: [] lo [] med [] hi   [x] Skin assessment post-treatment:  [x]intact []redness- no adverse reaction    []redness  adverse reaction:       25 min Therapeutic Exercise:  [x] See flow sheet : Rationale: increase ROM, increase strength and improve coordination to improve the patients ability to perform ADLs. 25 min Neuromuscular Re-education:  [x]  See flow sheet :core stability activities   Rationale: increase ROM, increase strength, improve coordination, improve balance and increase proprioception  to improve the patients ability to maintain neutral core stability. With   [] TE   [] TA   [] neuro   [] other: Patient Education: [x] Review HEP    [] Progressed/Changed HEP based on:   [] positioning   [] body mechanics   [] transfers   [] heat/ice application    [] other:      Other Objective/Functional Measures:      Pain Level (0-10 scale) post treatment: 1    ASSESSMENT/Changes in Function: Ms. Tiffany Cole was more painful today and had a harder time maintaining neutral spinal posturing with quadruped activities. Able to correct with tactile cuing from therapy. Patient will continue to benefit from skilled PT services to modify and progress therapeutic interventions, address functional mobility deficits, address ROM deficits, address strength deficits, analyze and address soft tissue restrictions, analyze and cue movement patterns, analyze and modify body mechanics/ergonomics, assess and modify postural abnormalities, address imbalance/dizziness and instruct in home and community integration to attain remaining goals. []  See Plan of Care  []  See progress note/recertification  []  See Discharge Summary         Progress towards goals / Updated goals:  Short Term Goals: To be accomplished in 1 weeks:  1. Establish and comply with HEP to include relaxation breathing for minimizing abdominal compensations, TA activation without strain   PROGRESSING: reports compliance      Long Term Goals: To be accomplished in 8 treatments:  1.  Pt will demonstrate understanding/compliance with final HEP in order to continue to improve independently upon DC    (Status at evaluation: na)    Reports compliance  2. Patient will Increase FOTO score to 74 points to demonstrate clinical significant increase allowing increased functional mobility within the home and community. (Status at evaluation: 47)    Reassess next session  3. Patient will report No pain with mobility, <3/10 pain with higher level activity to include  to increase functional activity tolerance   (Status at evaluation: pain with sit to stand, bed mobiilty )    Pain elevated most recent visit 2-3/10  4.  Patient will report >30 min standing, sitting and walking tolerance absent of pain to facilitate return to premorbid activity level   (Status at evaluation: 15-20)   Not yet formally reassessed        PLAN  []  Upgrade activities as tolerated     [x]  Continue plan of care  []  Update interventions per flow sheet       []  Discharge due to:_  []  Other:_      Emiliano Green PT 6/20/2017  1:07 PM    Future Appointments  Date Time Provider Debra Hammer   6/22/2017 10:30 [facility]   6/26/2017 9:00 AM [facility]   6/28/2017 9:30 AM [facility]

## 2017-06-22 ENCOUNTER — HOSPITAL ENCOUNTER (OUTPATIENT)
Dept: PHYSICAL THERAPY | Age: 32
Discharge: HOME | End: 2017-06-22
Payer: OTHER GOVERNMENT

## 2017-06-22 PROCEDURE — 97112 NEUROMUSCULAR REEDUCATION: CPT

## 2017-06-22 PROCEDURE — 97014 ELECTRIC STIMULATION THERAPY: CPT

## 2017-06-22 PROCEDURE — 97140 MANUAL THERAPY 1/> REGIONS: CPT

## 2017-06-22 NOTE — PROGRESS NOTES
PT DAILY TREATMENT NOTE 12    Patient Name: Shukri Fan  Date:2017  : 1985  [x]  Patient  Verified  Payor:  / Plan: [facility] DEPENDENTS / Product Type:  /    In time:1025  Out time:1123  Total Treatment Time (min): 58  Visit #: 5 of 8    Treatment Area: Low back pain [M54.5]    SUBJECTIVE  Pain Level (0-10 scale): 2  Any medication changes, allergies to medications, adverse drug reactions, diagnosis change, or new procedure performed?: [x] No    [] Yes (see summary sheet for update)  Subjective functional status/changes:   [] No changes reported  \"I woke up with spasms today, I didn't sleep very well because of the baby. \"    OBJECTIVE    Modality rationale: decrease pain and increase tissue extensibility to improve the patients ability to improve ease with mobility.    Min Type Additional Details   10 [x] Estim:  [x]Unatt       [x]IFC  []Premod                        []Other:  []w/ice   [x]w/heat  Position:prone  Location:low back    [] Estim: []Att    []TENS instruct  []NMES                    []Other:  []w/US   []w/ice   []w/heat  Position:  Location:    []  Traction: [] Cervical       []Lumbar                       [] Prone          []Supine                       []Intermittent   []Continuous Lbs:  [] before manual  [] after manual    []  Ultrasound: []Continuous   [] Pulsed                           []1MHz   []3MHz W/cm2:  Location:    []  Iontophoresis with dexamethasone         Location: [] Take home patch   [] In clinic    []  Ice     []  heat  []  Ice massage  []  Laser   []  Anodyne Position:  Location:    []  Laser with stim  []  Other:  Position:  Location:    []  Vasopneumatic Device Pressure:       [] lo [] med [] hi   Temperature: [] lo [] med [] hi   [x] Skin assessment post-treatment:  [x]intact []redness- no adverse reaction    []redness  adverse reaction:         40 min Neuromuscular Re-education:  [x]  See flow sheet :core stability activities   Rationale: increase ROM, increase strength, improve coordination, improve balance and increase proprioception  to improve the patients ability to maintain neutral spinal posturing with ADLs. 8 min Manual Therapy:  STM, MFR lumbar paraspinals and B QL   Rationale: decrease pain, increase ROM, increase tissue extensibility, decrease trigger points and increase postural awareness to perform ADLs. With   [] TE   [] TA   [] neuro   [] other: Patient Education: [x] Review HEP    [] Progressed/Changed HEP based on:   [] positioning   [] body mechanics   [] transfers   [] heat/ice application    [] other:      Other Objective/Functional Measures:      Pain Level (0-10 scale) post treatment: 3    ASSESSMENT/Changes in Function: Ms. Inocencia Bhat was painful today and with more spasm. Responded well to soft tissue mobilization and addition of IFC with moist heat. Patient will continue to benefit from skilled PT services to modify and progress therapeutic interventions, address functional mobility deficits, address ROM deficits, address strength deficits, analyze and address soft tissue restrictions, analyze and cue movement patterns, analyze and modify body mechanics/ergonomics, assess and modify postural abnormalities, address imbalance/dizziness and instruct in home and community integration to attain remaining goals. []  See Plan of Care  []  See progress note/recertification  []  See Discharge Summary         Progress towards goals / Updated goals:  Short Term Goals: To be accomplished in 1 weeks:  1. Establish and comply with HEP to include relaxation breathing for minimizing abdominal compensations, TA activation without strain                        PROGRESSING: reports compliance      Long Term Goals: To be accomplished in 8 treatments:  1.  Pt will demonstrate understanding/compliance with final HEP in order to continue to improve independently upon DC                         (Status at evaluation: na)                         Reports compliance  2. Patient will Increase FOTO score to 74 points to demonstrate clinical significant increase allowing increased functional mobility within the home and community. (Status at evaluation: 47)                         Reassess next session  3. Patient will report No pain with mobility, <3/10 pain with higher level activity to include  to increase functional activity tolerance                        (Status at evaluation: pain with sit to stand, bed mobiilty )                         Pain elevated most recent visit 2-3/10  4.  Patient will report >30 min standing, sitting and walking tolerance absent of pain to facilitate return to premorbid activity level                        (Status at evaluation: 15-20)                        improving slowly    PLAN  []  Upgrade activities as tolerated     [x]  Continue plan of care  []  Update interventions per flow sheet       []  Discharge due to:_  []  Other:_      Cornelius Raymond, PT 6/22/2017  11:09 AM    Future Appointments  Date Time Provider Debra Hammer   6/26/2017 9:00 AM Harika Stanton, PT [facility]   6/28/2017 9:30 [facility]

## 2017-06-26 ENCOUNTER — HOSPITAL ENCOUNTER (OUTPATIENT)
Dept: PHYSICAL THERAPY | Age: 32
Discharge: HOME | End: 2017-06-26
Payer: OTHER GOVERNMENT

## 2017-06-26 PROCEDURE — 97112 NEUROMUSCULAR REEDUCATION: CPT | Performed by: PHYSICAL THERAPIST

## 2017-06-26 NOTE — PROGRESS NOTES
PT DAILY TREATMENT NOTE     Patient Name: Aron Teixeira  Date:2017  : 1985  [x]  Patient  Verified  Payor:  / Plan: [facility]  ACTIVE DUTY AND DEPENDENTS / Product Type: Sharaddeemile Dus /    In time:9:00  Out time: 9:55  Total Treatment Time (min): 55  Visit #: 6 of 8    Treatment Area: Low back pain [M54.5]    SUBJECTIVE  Pain Level (0-10 scale): 0  Any medication changes, allergies to medications, adverse drug reactions, diagnosis change, or new procedure performed?: [x] No    [] Yes (see summary sheet for update)  Subjective functional status/changes:   [] No changes reported  No pain today but pain was a #6 Saturday after she washed her dog in bathtub. Bending forward is a problem when caring for her  10 mos old son.     OBJECTIVE     55 min Neuromuscular Re-education:  [x]  See flow sheet :core stability activities and McMenzie assessment with update of HEP   Rationale: increase ROM, increase strength, improve coordination, improve balance and increase proprioception  to improve the patients ability to maintain neutral spinal posturing with ADLs.         With   [] TE   [] TA   [] neuro   [] other: Patient Education: [x] Review HEP    [] Progressed/Changed HEP based on:   [] positioning   [] body mechanics   [] transfers   [] heat/ice application    [] other:       Other Objective/Functional Measures:                Meg assessment - produced with RFIS; abolished with REIL - updated HEP          Pain Level (0-10 scale) post treatment: 0     ASSESSMENT/Changes in Function: Great response to repeated extension in standing and lying with no pain - needs to get to end range to allow full reduction of derangement.      Patient will continue to benefit from skilled PT services to modify and progress therapeutic interventions, address functional mobility deficits, address ROM deficits, address strength deficits, analyze and address soft tissue restrictions, analyze and cue movement patterns, analyze and modify body mechanics/ergonomics, assess and modify postural abnormalities, address imbalance/dizziness and instruct in home and community integration to attain remaining goals.      []  See Plan of Care  []  See progress note/recertification  []  See Discharge Summary      Progress towards goals / Updated goals:  Short Term Goals: To be accomplished in 1 weeks:  1. Establish and comply with HEP to include relaxation breathing for minimizing abdominal compensations, TA activation without strain                        PROGRESSING: reports compliance      Long Term Goals: To be accomplished in 8 treatments:  1. Pt will demonstrate understanding/compliance with final HEP in order to continue to improve independently upon DC                         (Status at evaluation: na)                         Reports compliance  2. Patient will Increase FOTO score to 74 points to demonstrate clinical significant increase allowing increased functional mobility within the home and community.                       (Status at evaluation: 54)                         Reassess next session  3. Patient will report No pain with mobility, <3/10 pain with higher level activity to include  to increase functional activity tolerance                        (Status at evaluation: pain with sit to stand, bed mobiilty )                         Pain elevated most recent visit 2-3/10  4.  Patient will report >30 min standing, sitting and walking tolerance absent of pain to facilitate return to premorbid activity level                        (Status at evaluation: 15-20)                        improving slowly     PLAN  []  Upgrade activities as tolerated     [x]  Continue plan of care  []  Update interventions per flow sheet       []  Discharge due to:_  [x]  Other:_  Assess status with addition of extension exercises.     Viviana Goodson, PT 6/26/2017  9:09 AM    Future Appointments  Date Time Provider Department Center   6/28/2017 9:30 AM [facility]

## 2017-06-28 ENCOUNTER — HOSPITAL ENCOUNTER (OUTPATIENT)
Dept: PHYSICAL THERAPY | Age: 32
Discharge: HOME | End: 2017-06-28
Payer: OTHER GOVERNMENT

## 2017-06-28 PROCEDURE — 97110 THERAPEUTIC EXERCISES: CPT

## 2017-06-28 PROCEDURE — 97014 ELECTRIC STIMULATION THERAPY: CPT

## 2017-06-28 PROCEDURE — 97112 NEUROMUSCULAR REEDUCATION: CPT

## 2017-06-28 NOTE — PROGRESS NOTES
PT DAILY TREATMENT NOTE 12-16    Patient Name: Luiza Garza  Date:2017  : 1985  [x]  Patient  Verified  Payor:  / Plan: [facility]  ACTIVE DUTY AND DEPENDENTS / Product Type:  /    In time:930  Out time:1030  Total Treatment Time (min): 60  Visit #: 7 of 8    Treatment Area: Low back pain [M54.5]    SUBJECTIVE  Pain Level (0-10 scale): 0  Any medication changes, allergies to medications, adverse drug reactions, diagnosis change, or new procedure performed?: [x] No    [] Yes (see summary sheet for update)  Subjective functional status/changes:   [] No changes reported  \"I'm feeling better today, just tight. \"    OBJECTIVE    Modality rationale: decrease pain and increase tissue extensibility to improve the patients ability to improve ease with mobility.    Min Type Additional Details   10 [x] Estim:  [x]Unatt       [x]IFC  []Premod                        []Other:  []w/ice   [x]w/heat  Position:prone  Location:low back    [] Estim: []Att    []TENS instruct  []NMES                    []Other:  []w/US   []w/ice   []w/heat  Position:  Location:    []  Traction: [] Cervical       []Lumbar                       [] Prone          []Supine                       []Intermittent   []Continuous Lbs:  [] before manual  [] after manual    []  Ultrasound: []Continuous   [] Pulsed                           []1MHz   []3MHz W/cm2:  Location:    []  Iontophoresis with dexamethasone         Location: [] Take home patch   [] In clinic    []  Ice     []  heat  []  Ice massage  []  Laser   []  Anodyne Position:  Location:    []  Laser with stim  []  Other:  Position:  Location:    []  Vasopneumatic Device Pressure:       [] lo [] med [] hi   Temperature: [] lo [] med [] hi   [x] Skin assessment post-treatment:  [x]intact []redness- no adverse reaction    []redness  adverse reaction:         25 min Therapeutic Exercise:  [x] See flow sheet :   Rationale: increase ROM, increase strength and improve coordination to improve the patients ability to improve ease with positional tolerance. 25 min Neuromuscular Re-education:  [x]  See flow sheet :core stability activities    Rationale: increase ROM, increase strength, improve coordination, improve balance and increase proprioception  to improve the patients ability to improve core stability with daily activities. With   [] TE   [] TA   [] neuro   [] other: Patient Education: [x] Review HEP    [] Progressed/Changed HEP based on:   [] positioning   [] body mechanics   [] transfers   [] heat/ice application    [] other:      Other Objective/Functional Measures:      Pain Level (0-10 scale) post treatment: 0    ASSESSMENT/Changes in Function: Ms. Thanh Zheng had better tolerance of exercises today and reports improvement with extension-based activities at home. Patient will continue to benefit from skilled PT services to modify and progress therapeutic interventions, address functional mobility deficits, address ROM deficits, address strength deficits, analyze and address soft tissue restrictions, analyze and cue movement patterns, analyze and modify body mechanics/ergonomics, assess and modify postural abnormalities, address imbalance/dizziness and instruct in home and community integration to attain remaining goals. []  See Plan of Care  []  See progress note/recertification  []  See Discharge Summary         Progress towards goals / Updated goals:  Short Term Goals: To be accomplished in 1 weeks:  1. Establish and comply with HEP to include relaxation breathing for minimizing abdominal compensations, TA activation without strain                        PROGRESSING: reports compliance      Long Term Goals: To be accomplished in 8 treatments:  1.  Pt will demonstrate understanding/compliance with final HEP in order to continue to improve independently upon DC                         (Status at evaluation: na)                         Reports compliance  2. Patient will Increase FOTO score to 74 points to demonstrate clinical significant increase allowing increased functional mobility within the home and community.                       (Status at evaluation: 54)                         Reassess next session  3. Patient will report No pain with mobility, <3/10 pain with higher level activity to include  to increase functional activity tolerance                        (Status at evaluation: pain with sit to stand, bed mobiilty )                         Pain elevated most recent visit 2-3/10  4. Patient will report >30 min standing, sitting and walking tolerance absent of pain to facilitate return to premorbid activity level                        (Status at evaluation: 15-20)                        improving slowly    PLAN  []  Upgrade activities as tolerated     [x]  Continue plan of care  []  Update interventions per flow sheet       []  Discharge due to:_  []  Other:_      Olaf Castanon, PT 6/28/2017  9:49 AM    No future appointments.

## 2017-07-06 ENCOUNTER — HOSPITAL ENCOUNTER (OUTPATIENT)
Dept: PHYSICAL THERAPY | Age: 32
Discharge: HOME | End: 2017-07-06
Payer: OTHER GOVERNMENT

## 2017-07-06 PROCEDURE — 97110 THERAPEUTIC EXERCISES: CPT

## 2017-07-06 PROCEDURE — 97014 ELECTRIC STIMULATION THERAPY: CPT

## 2017-07-06 PROCEDURE — 97112 NEUROMUSCULAR REEDUCATION: CPT

## 2017-07-06 NOTE — PROGRESS NOTES
[facility] [address], [address] [phone] fax    Physician Update  [x] Progress Note  [] Discharge Summary    Patient name: Susanna Dixon Start of Care: 2017   Referral source: Mike Junior MD : 1985                          Medical Diagnosis: Low back pain [M54.5] Onset Date:May 2017, Oct 2016                          Treatment Diagnosis: low back pain   Prior Hospitalization: see medical history Provider#: [id number]   Medications: Verified on Patient summary List    Comorbidities:  10/16   Prior Level of Function: Active andhealthy, chronic left UQ pain and spasms; used to ienjoy cross fit, run/jog/hike yard work. Recent increase stress with house repairs       Visits from Start of Care: 8    Missed Visits: 0    Status at Evaluation: Patient is a 36 y. o.female presenting with Low back pain [M54.5]. Mrs. Kyle Mtz reports onset low back pain in October one month after , which was severe but self resolved. Saw return of severe pain in May with out particular provocation, saw in ER, PCP and spine center with diagnosis of muscular origin. 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 She lacks end range flexion with soft tissue restrictions, ext noted with decreased lumbar facet mobility.   We will work to modulate pain and irritation to allow full return of trunk mobility and flexibility to allow return hip and core strength    Patient will benefit from skilled PT services to address deficits and facilitate return to premorbid activity level and promote improved quality of life.      Progress towards Goals:   Functional Gains: everything seems easier, doing more without hurting, more flexible   Functional Deficits: spasms are still there, some days I feel like nothing has changed  % improvement: 60%  Pain   Average: 2-3/10       Best: 0/10     Worst: 5/10- when spasms occur, which remain ~ 3/days a week  Patient Goal: \"run and cross fit again\"    Short Term Goals: To be accomplished in 1 weeks:  1. Establish and comply with HEP to include relaxation breathing for minimizing abdominal compensations, TA activation without strain                        MET       Long Term Goals: To be accomplished in 8 treatments:  1. Pt will demonstrate understanding/compliance with final HEP in order to continue to improve independently upon DC                         (Status at evaluation: na)                         Reports compliance daily, still focus on pain relief with HEP, will progress stability ex as spasms decline  2. Patient will Increase FOTO score to 74 points to demonstrate clinical significant increase allowing increased functional mobility within the home and community.                       (Status at evaluation: 54)                         NOT MET 54  3. Patient will report No pain with mobility, <3/10 pain with higher level activity to include  to increase functional activity tolerance                        (Status at evaluation: pain with sit to stand, bed mobiilty )                         PROGRESSIN-3 on average with pain persisting when seated on floor, getting out of bed   4. Patient will report >30 min standing, sitting and walking tolerance absent of pain to facilitate return to premorbid activity level                        (Status at evaluation: 15-20)                     MET     Goals: to be achieved in 4 weeks:  1. Pt will demonstrate understanding/compliance with final HEP in order to continue to improve independently upon DC                         (Status at re-evaluation: Reports compliance daily, still focus on pain relief with HEP, will progress stability ex as spasms decline)                           2.  Patient will Increase FOTO score to 74 points to demonstrate clinical significant increase allowing increased functional mobility within the home and community.                       (Status at re-evaluation: 47)                          3. Patient will report No pain with mobility, <3/10 pain with higher level activity to include  to increase functional activity tolerance                        (Status at re-evaluation: 2-3 on average with pain persisting when seated on floor, getting out of bed)                           4. Patient will return to low level jogging, progress lifting with heavy emphasis on pain free and proper mechanics to facilitate return to premorbid activity level                        (Status at re-evaluation: avoiding provocative activity as recommended)                          ASSESSMENT/RECOMMENDATIONS:  Mrs. Philly No has seen good initial progress with skilled PT to address LBP and spasms. While spasms continue to occur at least 3/week, they are less intense and and more easily resolved. Still no specific provocation. We will continue with skilled PT to address remaining deficits and strengthen core to allow safe progression of daily and recreational activity.    [x]Continue therapy per initial plan/protocol at a frequency of  2 x per week for 4 weeks  []Continue therapy with the following recommended changes:_____________________      _____________________________________________________________________  []Discontinue therapy progressing towards or have reached established goals  []Discontinue therapy due to lack of appreciable progress towards goals  []Discontinue therapy due to lack of attendance or compliance  []Await Physician's recommendations/decisions regarding therapy  []Other:________________________________________________________________    Thank you for this referral.   Nabila Kaur, PT 7/6/2017 11:01 AM  NOTE TO PHYSICIAN:  PLEASE COMPLETE THE ORDERS BELOW AND   FAX TO [facility]: [phone]  If you are unable to process this request in 24 hours please contact our office: [phone]    []  I have read the above report and request that my patient continue as recommended. []  I have read the above report and request that my patient continue therapy with the following changes/special instructions:________________________________________  []I have read the above report and request that my patient be discharged from therapy.     Physicians signature: ______________________________Date: _____Time:_______

## 2017-07-06 NOTE — PROGRESS NOTES
PT DAILY TREATMENT NOTE 12    Patient Name: Kevin Nunez  Date:2017  : 1985  [x]  Patient  Verified  Payor:  / Plan: [employer] [facility] [address] AND DEPENDENTS / Product Type:  /    In time:1057  Out time:1148  Total Treatment Time (min): 46  Visit #: 8 of 8    Treatment Area: Low back pain [M54.5]    SUBJECTIVE  Pain Level (0-10 scale): 2-3  Any medication changes, allergies to medications, adverse drug reactions, diagnosis change, or new procedure performed?: [x] No    [] Yes (see summary sheet for update)  Subjective functional status/changes:   [] No changes reported  The last three days I have had spasms     OBJECTIVE    Modality rationale: decrease pain and increase tissue extensibility to improve the patients ability to improve ease with mobility   Min Type Additional Details   10 [x] Estim:  [x]Unatt       []IFC  []Premod                        []Other:  []w/ice   [x]w/heat  Position:prone   Location:low back     [] Estim: []Att    []TENS instruct  []NMES                    []Other:  []w/US   []w/ice   []w/heat  Position:  Location:    []  Traction: [] Cervical       []Lumbar                       [] Prone          []Supine                       []Intermittent   []Continuous Lbs:  [] before manual  [] after manual    []  Ultrasound: []Continuous   [] Pulsed                           []1MHz   []3MHz W/cm2:  Location:    []  Iontophoresis with dexamethasone         Location: [] Take home patch   [] In clinic    []  Ice     []  heat  []  Ice massage  []  Laser   []  Anodyne Position:  Location:    []  Laser with stim  []  Other:  Position:  Location:    []  Vasopneumatic Device Pressure:       [] lo [] med [] hi   Temperature: [] lo [] med [] hi   [x] Skin assessment post-treatment:  [x]intact []redness- no adverse reaction    []redness  adverse reaction:         16 min Therapeutic Exercise:  [x] See flow sheet :   Rationale: increase ROM, increase strength and improve coordination to improve the patients ability to improve ease with positional tolerance.      25 min Neuromuscular Re-education:  [x]  See flow sheet :  ADDED OTB to open book, PROGRESSED walk aways to include lunges   Rationale: increase ROM, increase strength, improve coordination, improve balance and increase proprioception  to improve the patients ability to improve core stability with daily activities. With   [x] TE   [] TA   [x] neuro   [] other: Patient Education: [x] Review HEP, POC   [] Progressed/Changed HEP based on:   [] positioning   [] body mechanics   [] transfers   [] heat/ice application    [] other:      Other Objective/Functional Measures: FOTO 54     Pain Level (0-10 scale) post treatment: 0    ASSESSMENT      [x]  See progress note/recertification  []  See Discharge Summary         Progress towards goals / Updated goals:  1. Pt will demonstrate understanding/compliance with final HEP in order to continue to improve independently upon DC                         (Status at re-evaluation: Reports compliance daily, still focus on pain relief with HEP, will progress stability ex as spasms decline)                           2. Patient will Increase FOTO score to 74 points to demonstrate clinical significant increase allowing increased functional mobility within the home and community.                       (Status at re-evaluation: 47)                          3. Patient will report No pain with mobility, <3/10 pain with higher level activity to include  to increase functional activity tolerance                        (Status at re-evaluation: 2-3 on average with pain persisting when seated on floor, getting out of bed)                           4.  Patient will return to low level jogging, progress lifting with heavy emphasis on pain free and proper mechanics to facilitate return to premorbid activity level                        (Status at re-evaluation: avoiding provocative activity as recommended)    PLAN  []  Upgrade activities as tolerated     [x]  Continue plan of care  []  Update interventions per flow sheet       []  Discharge due to:_  []  Other:_      Graciela Herrera, PT 7/6/2017  11:25 AM    Future Appointments  Date Time Provider Debra Hammer   7/7/2017 10:30 AM Graciela Herrera, SHARDA Sanchez

## 2017-07-07 ENCOUNTER — HOSPITAL ENCOUNTER (OUTPATIENT)
Dept: PHYSICAL THERAPY | Age: 32
Discharge: HOME | End: 2017-07-07
Payer: OTHER GOVERNMENT

## 2017-07-07 PROCEDURE — 97110 THERAPEUTIC EXERCISES: CPT

## 2017-07-07 PROCEDURE — 97014 ELECTRIC STIMULATION THERAPY: CPT

## 2017-07-07 PROCEDURE — 97112 NEUROMUSCULAR REEDUCATION: CPT

## 2017-07-07 NOTE — PROGRESS NOTES
PT DAILY TREATMENT NOTE 12    Patient Name: Lois Mishra  Date:2017  : 1985  [x]  Patient  Verified  Payor:  / Plan: [facility]  ACTIVE DUTY AND DEPENDENTS / Product Type:  /    In time:1047  Out time:1143  Total Treatment Time (min): 64  Visit #: 1 of 8    Treatment Area: Low back pain [M54.5]    SUBJECTIVE  Pain Level (0-10 scale): 0  Any medication changes, allergies to medications, adverse drug reactions, diagnosis change, or new procedure performed?: [x] No    [] Yes (see summary sheet for update)  Subjective functional status/changes:   [] No changes reported  The left side was feeling better, but the right side bothered me more the other day     OBJECTIVE    Modality rationale: decrease inflammation, increase tissue extensibility and increase muscle contraction/control to improve the patients ability to regain full pain free mobility and recreation    Min Type Additional Details   10 [x] Estim:  [x]Unatt       [x]IFC  []Premod                        []Other:  []w/ice   [x]w/heat  Position:prone  Location:low back     [] Estim: []Att    []TENS instruct  []NMES                    []Other:  []w/US   []w/ice   []w/heat  Position:  Location:    []  Traction: [] Cervical       []Lumbar                       [] Prone          []Supine                       []Intermittent   []Continuous Lbs:  [] before manual  [] after manual    []  Ultrasound: []Continuous   [] Pulsed                           []1MHz   []3MHz W/cm2:  Location:    []  Iontophoresis with dexamethasone         Location: [] Take home patch   [] In clinic    []  Ice     []  heat  []  Ice massage  []  Laser   []  Anodyne Position:  Location:    []  Laser with stim  []  Other:  Position:  Location:    []  Vasopneumatic Device Pressure:       [] lo [] med [] hi   Temperature: [] lo [] med [] hi   [x] Skin assessment post-treatment:  [x]intact []redness- no adverse reaction    []redness  adverse reaction:         14 min Therapeutic Exercise:  [x] See flow sheet :  ADDED LTR stretch fro 2 minutes with arm in full scaption   Rationale: increase ROM, increase strength and improve coordination to improve the patients ability to improve ease with positional tolerance.          32 min Neuromuscular Re-education:  [x]  See flow sheet :   Rationale: increase ROM, increase strength, improve coordination, improve balance and increase proprioception  to improve the patients ability to improve core stability with daily activities          With   [] TE   [] TA   [] neuro   [] other: Patient Education: [x] Review HEP    [] Progressed/Changed HEP based on:   [] positioning   [] body mechanics   [] transfers   [] heat/ice application    [] other:      Other Objective/Functional Measures:      Pain Level (0-10 scale) post treatment: 0    ASSESSMENT/Changes in Function: patient did well with progression of stretching. Fluidity of motion is progressing     Patient will continue to benefit from skilled PT services to modify and progress therapeutic interventions, address functional mobility deficits, address ROM deficits, address strength deficits, analyze and address soft tissue restrictions, analyze and cue movement patterns, analyze and modify body mechanics/ergonomics, assess and modify postural abnormalities and instruct in home and community integration to attain remaining goals. []  See Plan of Care  []  See progress note/recertification  []  See Discharge Summary         Progress towards goals / Updated goals:  1. Pt will demonstrate understanding/compliance with final HEP in order to continue to improve independently upon DC                         (Status at re-evaluation: Reports compliance daily, still focus on pain relief with HEP, will progress stability ex as spasms decline)       2.  Patient will Increase FOTO score to 74 points to demonstrate clinical significant increase allowing increased functional mobility within the home and community.                       (Status at re-evaluation: 54)       3. Patient will report No pain with mobility, <3/10 pain with higher level activity to include  to increase functional activity tolerance                        (Status at re-evaluation: 2-3 on average with pain persisting when seated on floor, getting out of bed)       4.  Patient will return to low level jogging, progress lifting with heavy emphasis on pain free and proper mechanics to facilitate return to premorbid activity level                        (Status at re-evaluation: avoiding provocative activity as recommended)       PLAN  []  Upgrade activities as tolerated     [x]  Continue plan of care  []  Update interventions per flow sheet       []  Discharge due to:_  []  Other:_      Graciela Herrera, SHARDA 7/7/2017  2:18 PM    Future Appointments  Date Time Provider Debra Hammer   7/10/2017 11:30 AM Graciela Herrera, PT [facility]   7/13/2017 11:00 AM Graciela Herrera, PT [facility]   7/19/2017 3:30 PM Debbie Cai, PT [facility]   7/21/2017 3:30 PM Debbie Cai, PT [facility]   7/24/2017 3:30 PM Debbie Cai, PT [facility]   7/26/2017 3:30 PM [facility]

## 2017-07-10 ENCOUNTER — HOSPITAL ENCOUNTER (OUTPATIENT)
Dept: PHYSICAL THERAPY | Age: 32
Discharge: HOME | End: 2017-07-10
Payer: OTHER GOVERNMENT

## 2017-07-10 PROCEDURE — 97014 ELECTRIC STIMULATION THERAPY: CPT

## 2017-07-10 PROCEDURE — 97112 NEUROMUSCULAR REEDUCATION: CPT

## 2017-07-10 PROCEDURE — 97110 THERAPEUTIC EXERCISES: CPT

## 2017-07-10 NOTE — PROGRESS NOTES
PT DAILY TREATMENT NOTE     Patient Name: Chio Diamond  Date:7/10/2017  : 1985  [x]  Patient  Verified  Payor:  / Plan: [facility]  ACTIVE DUTY AND DEPENDENTS / Product Type:  /    In time:1139  Out time:1247  Total Treatment Time (min): 68  Visit #: 2 of 8    Treatment Area: Low back pain [M54.5]    SUBJECTIVE  Pain Level (0-10 scale): 0  Any medication changes, allergies to medications, adverse drug reactions, diagnosis change, or new procedure performed?: [x] No    [] Yes (see summary sheet for update)  Subjective functional status/changes:   [] No changes reported  I haven't had spasms over the weekend     OBJECTIVE    Modality rationale: decrease inflammation and increase tissue extensibility to improve the patients ability to regain full activity absent of spams    Min Type Additional Details   10 [x] Estim:  [x]Unatt       [x]IFC  []Premod                        []Other:  []w/ice   [x]w/heat  Position:prone  Location:low back, r ql    [] Estim: []Att    []TENS instruct  []NMES                    []Other:  []w/US   []w/ice   []w/heat  Position:  Location:    []  Traction: [] Cervical       []Lumbar                       [] Prone          []Supine                       []Intermittent   []Continuous Lbs:  [] before manual  [] after manual    []  Ultrasound: []Continuous   [] Pulsed                           []1MHz   []3MHz W/cm2:  Location:    []  Iontophoresis with dexamethasone         Location: [] Take home patch   [] In clinic    []  Ice     []  heat  []  Ice massage  []  Laser   []  Anodyne Position:  Location:    []  Laser with stim  []  Other:  Position:  Location:    []  Vasopneumatic Device Pressure:       [] lo [] med [] hi   Temperature: [] lo [] med [] hi   [x] Skin assessment post-treatment:  [x]intact []redness- no adverse reaction    []redness  adverse reaction:       10 min Therapeutic Exercise:  [x] See flow sheet :   Rationale: increase ROM, increase strength and improve coordination to improve the patients ability to improve ease with positional tolerance.       50 min Neuromuscular Re-education:  [x]  See flow sheet :  ADDED modified worlds greatest stretch and inch worm;  ADDED bosu rotations with g tb   Rationale: increase ROM, increase strength, improve coordination, improve balance and increase proprioception  to improve the patients ability to improve core stability with daily activities          With   [] TE   [] TA   [x] neuro   [] other: Patient Education: [x] Review HEP, progression of activity, prognosis and long term care    [] Progressed/Changed HEP based on:   [] positioning   [] body mechanics   [] transfers   [] heat/ice application    [] other:      Other Objective/Functional Measures:      Pain Level (0-10 scale) post treatment: 0    ASSESSMENT/Changes in Function: patient doing great, weaker on right but noted great awareness with increased challenge     Patient will continue to benefit from skilled PT services to modify and progress therapeutic interventions, address functional mobility deficits, address ROM deficits, address strength deficits, analyze and address soft tissue restrictions, analyze and cue movement patterns, analyze and modify body mechanics/ergonomics, assess and modify postural abnormalities and instruct in home and community integration to attain remaining goals. []  See Plan of Care  []  See progress note/recertification  []  See Discharge Summary         Progress towards goals / Updated goals:  1. Pt will demonstrate understanding/compliance with final HEP in order to continue to improve independently upon DC                         (Status at re-evaluation: Reports compliance daily, still focus on pain relief with HEP, will progress stability ex as spasms decline)     Doing great with HEP  2.  Patient will Increase FOTO score to 74 points to demonstrate clinical significant increase allowing increased functional mobility within the home and community.                       (Status at re-evaluation: 54)     assess at end of poc  3. Patient will report No pain with mobility, <3/10 pain with higher level activity to include  to increase functional activity tolerance                        (Status at re-evaluation: 2-3 on average with pain persisting when seated on floor, getting out of bed)     progressing well, had a good weekend without spasms  4.  Patient will return to low level jogging, progress lifting with heavy emphasis on pain free and proper mechanics to facilitate return to premorbid activity level                        (Status at re-evaluation: avoiding provocative activity as recommended)   will begin progression over the next few weeks    PLAN  []  Upgrade activities as tolerated     [x]  Continue plan of care  []  Update interventions per flow sheet       []  Discharge due to:_  []  Other:_      Sharon Kidd, PT 7/10/2017  1:58 PM    Future Appointments  Date Time Provider Debra Hammer   7/13/2017 11:00 AM [facility]   7/19/2017 3:30 PM [facility]   7/21/2017 3:30 PM SHARDA TangHS [facility]   7/24/2017 3:30 PM Linnea Devi, SHARDA Ortiz [facility]   7/26/2017 3:30 PM [facility]

## 2017-07-13 ENCOUNTER — HOSPITAL ENCOUNTER (OUTPATIENT)
Dept: PHYSICAL THERAPY | Age: 32
Discharge: HOME | End: 2017-07-13
Payer: OTHER GOVERNMENT

## 2017-07-13 PROCEDURE — 97110 THERAPEUTIC EXERCISES: CPT

## 2017-07-13 PROCEDURE — 97112 NEUROMUSCULAR REEDUCATION: CPT

## 2017-07-13 NOTE — PROGRESS NOTES
PT DAILY TREATMENT NOTE     Patient Name: Andra Goldberg  Date:2017  : 1985  [x]  Patient  Verified  Payor:  / Plan: [email or web address] [facility] [address] AND DEPENDENTS / Product Type:  /    In time:1055  Out time:1148  Total Treatment Time (min): 48  Visit #: 3 of 8    Treatment Area: Low back pain [M54.5]    SUBJECTIVE  Pain Level (0-10 scale): 0  Any medication changes, allergies to medications, adverse drug reactions, diagnosis change, or new procedure performed?: [x] No    [] Yes (see summary sheet for update)  Subjective functional status/changes:   [] No changes reported  No spasms     OBJECTIVE    Modality rationale: Deferred to declining pain    Min Type Additional Details    [] Estim:  []Unatt       []IFC  []Premod                        []Other:  []w/ice   []w/heat  Position:  Location:    [] Estim: []Att    []TENS instruct  []NMES                    []Other:  []w/US   []w/ice   []w/heat  Position:  Location:    []  Traction: [] Cervical       []Lumbar                       [] Prone          []Supine                       []Intermittent   []Continuous Lbs:  [] before manual  [] after manual    []  Ultrasound: []Continuous   [] Pulsed                           []1MHz   []3MHz W/cm2:  Location:    []  Iontophoresis with dexamethasone         Location: [] Take home patch   [] In clinic    []  Ice     []  heat  []  Ice massage  []  Laser   []  Anodyne Position:  Location:    []  Laser with stim  []  Other:  Position:  Location:    []  Vasopneumatic Device Pressure:       [] lo [] med [] hi   Temperature: [] lo [] med [] hi   [] Skin assessment post-treatment:  []intact []redness- no adverse reaction    []redness  adverse reaction:         10 min Therapeutic Exercise:  [x] See flow sheet :  PROGRESSED TM to 3 minutes walk f/b 30 jog, 30 walk x 2    Rationale: increase ROM, increase strength and improve coordination to improve the patients ability to improve ease with positional tolerance.       43 min Neuromuscular Re-education:  [x]  See flow sheet :  PROGRESSED ltr to include pilates form with leg raise and rotate, ADDED chin lift/chest lift pilates mat \"hundreds? Rationale: increase ROM, increase strength, improve coordination, improve balance and increase proprioception  to improve the patients ability to improve core stability with daily activities          With   [] TE   [] TA   [] neuro   [] other: Patient Education: [x] Review HEP    [] Progressed/Changed HEP based on:   [] positioning   [] body mechanics   [] transfers   [] heat/ice application    [] other:      Other Objective/Functional Measures:      Pain Level (0-10 scale) post treatment: 0    ASSESSMENT/Changes in Function: challenged at neck with pilates hundreds and will need to monitor to avoid strain. Challenged with all today, but did great and saw no rebound spasm    Patient will continue to benefit from skilled PT services to modify and progress therapeutic interventions, address functional mobility deficits, address ROM deficits, address strength deficits, analyze and address soft tissue restrictions, analyze and cue movement patterns, analyze and modify body mechanics/ergonomics, assess and modify postural abnormalities and instruct in home and community integration to attain remaining goals. []  See Plan of Care  []  See progress note/recertification  []  See Discharge Summary         Progress towards goals / Updated goals:  1. Pt will demonstrate understanding/compliance with final HEP in order to continue to improve independently upon DC                         (Status at re-evaluation: Reports compliance daily, still focus on pain relief with HEP, will progress stability ex as spasms decline)     Doing great with HEP  2.  Patient will Increase FOTO score to 74 points to demonstrate clinical significant increase allowing increased functional mobility within the home and community.                       (Status at re-evaluation: 54)     assess at end of poc  3. Patient will report No pain with mobility, <3/10 pain with higher level activity to include  to increase functional activity tolerance                        (Status at re-evaluation: 2-3 on average with pain persisting when seated on floor, getting out of bed)     progressing well, had a good weekend without spasms  4.  Patient will return to low level jogging, progress lifting with heavy emphasis on pain free and proper mechanics to facilitate return to premorbid activity level                        (Status at re-evaluation: avoiding provocative activity as recommended)  CURRENT: initiated jog on TM, progressed core challenge       PLAN  []  Upgrade activities as tolerated     [x]  Continue plan of care  []  Update interventions per flow sheet       []  Discharge due to:_  []  Other:_      Marlin Thomas PT 7/13/2017  10:31 AM    Future Appointments  Date Time Provider Debra Hammer   7/13/2017 11:00 AM Marlin Thomas PT [facility]   7/19/2017 3:30 PM Yosef Santo, PT [facility]   7/21/2017 3:30 PM Yosef Santo, PT [facility]   7/24/2017 3:30 PM Yosef Santo, PT [facility]   7/26/2017 3:30 PM [facility]osef Santo, [facility]

## 2017-07-19 ENCOUNTER — APPOINTMENT (OUTPATIENT)
Dept: PHYSICAL THERAPY | Age: 32
End: 2017-07-19
Payer: OTHER GOVERNMENT

## 2017-07-21 ENCOUNTER — HOSPITAL ENCOUNTER (OUTPATIENT)
Dept: PHYSICAL THERAPY | Age: 32
Discharge: HOME | End: 2017-07-21
Payer: OTHER GOVERNMENT

## 2017-07-21 PROCEDURE — 97110 THERAPEUTIC EXERCISES: CPT

## 2017-07-21 PROCEDURE — 97112 NEUROMUSCULAR REEDUCATION: CPT

## 2017-07-21 NOTE — PROGRESS NOTES
PT DISCHARGE DAILY NOTE AND [id number]    Date:2017  Patient name: Sheila Elaine Start of Care: 2017   Referral source: Anny Mcneil MD : 1985                          Medical Diagnosis: Low back pain [M54.5] Onset Date:May 2017, Oct 2016                          Treatment Diagnosis: low back pain   Prior Hospitalization: see medical history Provider#: [id number]   Medications: Verified on Patient summary List    Comorbidities:  10/16   Prior Level of Function: Active andhealthy, chronic left UQ pain and spasms; used to ienjoy cross fit, run/jog/hike yard work. Recent increase stress with house repairs      Visits from Start of Care: 12    Missed Visits: 1    Reporting Period : 17 to 17    [x]  Patient  Verified  Payor:  / Plan: [employer] DEPENDENTS / Product Type:  /    In time:330  Out time:405  Total Treatment Time (min): 35  Visit #: 4 of 8    SUBJECTIVE  Pain Level (0-10 scale): 0  Any medication changes, allergies to medications, adverse drug reactions, diagnosis change, or new procedure performed?: [x] No    [] Yes (see summary sheet for update)  Subjective functional status/changes:   [] No changes reported  \"I'm doing better. \"    OBJECTIVE    Modality rationale: Patient declined   Min Type Additional Details    [] Estim:  []Unatt       []IFC  []Premod                        []Other:  []w/ice   []w/heat  Position:  Location:    [] Estim: []Att    []TENS instruct  []NMES                    []Other:  []w/US   []w/ice   []w/heat  Position:  Location:    []  Traction: [] Cervical       []Lumbar                       [] Prone          []Supine                       []Intermittent   []Continuous Lbs:  [] before manual  [] after manual    []  Ultrasound: []Continuous   [] Pulsed                           []1MHz   []3MHz W/cm2:  Location:    []  Iontophoresis with dexamethasone         Location: [] Take home patch   [] In clinic    [] Ice     []  heat  []  Ice massage  []  Laser   []  Anodyne Position:  Location:    []  Laser with stim  []  Other:  Position:  Location:    []  Vasopneumatic Device Pressure:       [] lo [] med [] hi   Temperature: [] lo [] med [] hi   [] Skin assessment post-treatment:  []intact []redness- no adverse reaction    []redness  adverse reaction:     10 min Therapeutic Exercise:  [x] See flow sheet :   Rationale: increase ROM, increase strength and improve coordination to improve the patients ability to perform ADLs. 25 min Neuromuscular Re-education:  _  See flow sheet :core re-ed & postural training. Rationale: increase ROM, increase strength, improve coordination, improve balance and increase proprioception  to improve the patients ability to improve return to previous functional level. With   [] TE   [] TA   [] neuro   [] other: Patient Education: [x] Review HEP    [] Progressed/Changed HEP based on:   [] positioning   [] body mechanics   [] transfers   [] heat/ice application    [] other:      Other Objective/Functional Measures: FOTO: 82     Pain Level (0-10 scale) post treatment: 0    Summary of Care:  1. Pt will demonstrate understanding/compliance with final HEP in order to continue to improve independently upon DC                         (Status at re-evaluation: Reports compliance daily, still focus on pain relief with HEP, will progress stability ex as spasms decline)      MET:continues to report compliance with HEP  2. Patient will Increase FOTO score to 74 points to demonstrate clinical significant increase allowing increased functional mobility within the home and community.                       (Status at re-evaluation: 54)     MET: 82  3.  Patient will report No pain with mobility, <3/10 pain with higher level activity to include  to increase functional activity tolerance                        (Status at re-evaluation: 2-3 on average with pain persisting when seated on floor, getting out of bed)      MET 0/10 but does report tension with repetitive activity  4. Patient will return to low level jogging, progress lifting with heavy emphasis on pain free and proper mechanics to facilitate return to premorbid activity level                        (Status at re-evaluation: avoiding provocative activity as recommended)   MET: initiated jog on TM in clinic with no rebound pain , reports jogging at home for ~ 2 miles without rebound pain     Functional Gains: everything, bending, getting out of bed, transitional movements, lifting son, squatting  Functional Deficits: tightness with higher level activities at times  % improvement: 90%  Pain   Average: 0/10       Best: 0/10     Worst: 0/10  Patient Goal: \"To continue doing what I learned here. \"     ASSESSMENT/Changes in Function: Ms. Nish Carrasco has been a pleasure to treat and has seen excellent response to PT for decreased pain & muscle tension. She has begun to resume her previous activities without rebound pain and should transition well to home exercise program. We will discharge from skilled PT services at this time.      Thank you for this referral!      PLAN  [x]Discontinue therapy: [x]Patient has reached or is progressing toward set goals      []Patient is non-compliant or has abdicated      []Due to lack of appreciable progress towards set [address], PT 7/21/2017  3:43 PM

## 2017-07-24 ENCOUNTER — APPOINTMENT (OUTPATIENT)
Dept: PHYSICAL THERAPY | Age: 32
End: 2017-07-24
Payer: OTHER GOVERNMENT

## 2017-07-26 ENCOUNTER — APPOINTMENT (OUTPATIENT)
Dept: PHYSICAL THERAPY | Age: 32
End: 2017-07-26
Payer: OTHER GOVERNMENT

## 2019-02-04 PROBLEM — Z34.90 PREGNANCY: Status: ACTIVE | Noted: 2019-02-04

## 2021-03-19 ENCOUNTER — HOSPITAL ENCOUNTER (OUTPATIENT)
Dept: PHYSICAL THERAPY | Age: 36
Discharge: HOME | End: 2021-03-19
Payer: OTHER GOVERNMENT

## 2021-03-19 PROCEDURE — 97112 NEUROMUSCULAR REEDUCATION: CPT

## 2021-03-19 PROCEDURE — 97140 MANUAL THERAPY 1/> REGIONS: CPT

## 2021-03-19 PROCEDURE — 97162 PT EVAL MOD COMPLEX 30 MIN: CPT

## 2021-03-19 NOTE — PROGRESS NOTES
[facility]  [address], [address] [phone] fax    Plan of Care/ Statement of Necessity for Physical Therapy Services           Patient name: Vaishali Strong Start of Care: 3/19/2021   Referral source: Naveen Giang MD : 1985    Medical Diagnosis: Low back pain [M54.5]  Payor:  / Plan: [address] DEPENDENTS / Product Type: [employer] /  Onset Date:chronic with exacerbation     Treatment Diagnosis: LBP   Prior Hospitalization: see medical history Provider#: [id number]   Medications: Verified on Patient summary List    Comorbidities: , chronicity of condition   Prior Level of Function: functionally (I); mother of 2, active running 3-4x/week for ~2miles    The Plan of Care and following information is based on the information from the initial evaluation. Assessment/ key information:   Ms. Shazia Davidson is a 34yo female who presents to PT with complaints of episodic LBP without LE referral. Pt neuro screen negative for myotomal changes, but did demonstrate dural tension with slump and SLR tests with DF over pressure. Symptoms were alleviated with extension prone press ups. In addition, pt demonstrating lumbopelvic dysfunction with right innominate up slip that was corrected with MET and shot gun today. Pt demonstrates decreased lateral and posterior hip strength, decreased flexibility, and reduced core endurance. Pt symptoms impair her ability to bend, lift, and sleep at PLOF.  Pt will benefit from skilled PT in order to address listed deficits, decrease pain, and maximize functional potential.     Evaluation Complexity History MEDIUM  Complexity : 1-2 comorbidities / personal factors will impact the outcome/ POC ; Examination MEDIUM Complexity : 3 Standardized tests and measures addressing body structure, function, activity limitation and / or participation in recreation  ;Presentation MEDIUM Complexity : Evolving with changing characteristics  ; Clinical Decision Making MEDIUM Complexity : FOTO score of 26-74  Overall Complexity Rating: MEDIUM  Problem List: pain affecting function, decrease ROM, decrease strength, impaired gait/ balance, decrease ADL/ functional abilitiies, decrease activity tolerance, decrease flexibility/ joint mobility and decrease transfer abilities   Treatment Plan may include any combination of the following: Therapeutic exercise, Therapeutic activities, Neuromuscular re-education, Physical agent/modality, Gait/balance training, Manual therapy, Aquatic therapy, Patient education, Self Care training, Functional mobility training, Home safety training and Stair training  Patient / Family readiness to learn indicated by: asking questions, trying to perform skills and interest  Persons(s) to be included in education: patient (P)  Barriers to Learning/Limitations: None  Patient Goal (s): figure out what exercises I can do when I have episodes  Patient Self Reported Health Status: excellent  Rehabilitation Potential: excellent  Short Term Goals: To be accomplished in 2 weeks:  1. Pt will report compliance with HEP in order to supplement PT treatment   Eval = established    Long Term Goals: To be accomplished in 10 treatments:  1. Pt will score at least 74 on FOTO in order to improve overall function, decrease pain, and facilitate return to PLOF. Eval = 65  2. Pt will demonstrate bilateral hip abduction and extension strength 5-/5 in order to increased ambulation distances in the community   Eval = bilaterally 4/5 into both planes  3. Pt will demonstrate level innominate for 3 consecutive treatments in order to improve overall quality of life and facilitate return to PLOF. Eval = right upslip      Frequency / Duration: Patient to be seen 2 times per week for 10 treatments.     Patient/ Caregiver education and instruction: Diagnosis, prognosis, self care, activity modification and exercises   [x]  Plan of care has been reviewed with RODRIGUE Conte, PT 3/19/2021 4:15 PM    ________________________________________________________________________    I certify that the above Therapy Services are being furnished while the patient is under my care. I agree with the treatment plan and certify that this therapy is necessary.     [de-identified] Signature:____________Date:_________TIME:________     Cricket Argueta MD  ** Signature, Date and Time must be completed for valid certification **    Please sign and return to [facility] [address], [address] [phone] fax

## 2021-03-19 NOTE — PROGRESS NOTES
PT DAILY TREATMENT NOTE     Patient Name: Nona Villasenor  Date:3/19/2021  : 1985  [x]  Patient  Verified  Payor:  / Plan: [email or web address] [facility] [address] AND DEPENDENTS / Product Type:  /    In time:304  Out time:356  Total Treatment Time (min): 52  Visit #: 1 of 10    Treatment Area: Low back pain [M54.5]    SUBJECTIVE  Pain Level (0-10 scale): 2  Any medication changes, allergies to medications, adverse drug reactions, diagnosis change, or new procedure performed?: [x] No    [] Yes (see summary sheet for update)  Subjective functional status/changes:   [] No changes reported  See evaluation    OBJECTIVE    30 min [x]Eval                  []Re-Eval     15 min Therapeutic Exercise:  [x] See flow sheet : explanation, demonstration, performance and distribution of HEP   Rationale: increase ROM and increase strength to improve the patients ability to perform ADLs    8 min Manual Therapy:  MET and pelvic shot gun to correct right innominate upslip   The manual therapy interventions were performed at a separate and distinct time from the therapeutic activities interventions.   Rationale: decrease pain and increase ROM to improve overall mobility           With   [] TE   [] TA   [] neuro   [] other: Patient Education: [x] Review HEP    [] Progressed/Changed HEP based on:   [] positioning   [] body mechanics   [] transfers   [] heat/ice application    [] other:      Other Objective/Functional Measures: see evaluation      Pain Level (0-10 scale) post treatment: 1.5    ASSESSMENT/Changes in Function: see POC     Patient will continue to benefit from skilled PT services to modify and progress therapeutic interventions, address functional mobility deficits, address ROM deficits, address strength deficits, analyze and address soft tissue restrictions, analyze and cue movement patterns, analyze and modify body mechanics/ergonomics, assess and modify postural abnormalities, address imbalance/dizziness and instruct in home and community integration to attain remaining goals. [x]  See Plan of Care  []  See progress note/recertification  []  See Discharge Summary         Progress towards goals / Updated goals:  1. Pt will report compliance with HEP in order to supplement PT treatment   Eval = established    Long Term Goals: To be accomplished in 10 treatments:  1. Pt will score at least 74 on FOTO in order to improve overall function, decrease pain, and facilitate return to PLOF. Eval = 65  2. Pt will demonstrate bilateral hip abduction and extension strength 5-/5 in order to increased ambulation distances in the community   Eval = bilaterally 4/5 into both planes  3. Pt will demonstrate level innominate for 3 consecutive treatments in order to improve overall quality of life and facilitate return to PLOF.     Eval = right upslip      PLAN  [x]  Upgrade activities as tolerated     [x]  Continue plan of care  []  Update interventions per flow sheet       []  Discharge due to:_  []  Other:_      Laxmi Conte, PT 3/19/2021  4:16 PM    Future Appointments   Date Time Provider Debra Hammer   3/30/2021  9:45 AM Bel Conte, PT [facility]   4/1/2021  9:45 AM Kendra Badillo PTA [facility]   4/6/2021  9:45 AM Bel Conte, PT [facility]   4/8/2021  9:45 AM Kendra Badillo PTA [facility]   4/13/2021  1:30 PM Mallika March, PT [facility]   4/15/2021  1:30 PM Kendra Badillo PTA [facility]   4/20/2021 10:30 AM Mallika March, PT [facility]   4/22/2021  9:45 AM Kendra Badillo PTA [facility]   4/26/2021  9:45 AM Mallika March, PT [facility]   4/28/2021  9:45 AM Mallika Ford PT [facility]

## 2021-03-30 ENCOUNTER — HOSPITAL ENCOUNTER (OUTPATIENT)
Dept: PHYSICAL THERAPY | Age: 36
Discharge: HOME | End: 2021-03-30
Payer: OTHER GOVERNMENT

## 2021-03-30 PROCEDURE — 97014 ELECTRIC STIMULATION THERAPY: CPT

## 2021-03-30 PROCEDURE — 97112 NEUROMUSCULAR REEDUCATION: CPT

## 2021-03-30 PROCEDURE — 97530 THERAPEUTIC ACTIVITIES: CPT

## 2021-03-30 NOTE — PROGRESS NOTES
PT DAILY TREATMENT NOTE 11    Patient Name: Alek Tripp  Date:3/30/2021  : 1985  [x]  Patient  Verified  Payor:  / Plan: [facility]  ACTIVE DUTY AND DEPENDENTS / Product Type:  /    In time: 9:52   Out time: 10:45  Total Treatment Time (min): 48  Visit #: 2 of 10    Treatment Area: Low back pain [M54.5]    SUBJECTIVE  Pain Level (0-10 scale): 1-2  Any medication changes, allergies to medications, adverse drug reactions, diagnosis change, or new procedure performed?: [x] No    [] Yes (see summary sheet for update)  Subjective functional status/changes:   [] No changes reported  Pt reports she noticed more pain/soreness after the MET last session. OBJECTIVE    Modality rationale: decrease pain and increase tissue extensibility to improve the patients ability to tolerate functional activities.     Min Type Additional Details   10 [x] Estim:  []Unatt       [x]IFC  []Premod                        []Other:  []w/ice   [x]w/heat  Position: prone with bolster under ankles  Location: B low back and B SIJ    [] Estim: []Att    []TENS instruct  []NMES                    []Other:  []w/US   []w/ice   []w/heat  Position:  Location:    []  Traction: [] Cervical       []Lumbar                       [] Prone          []Supine                       []Intermittent   []Continuous Lbs:  [] before manual  [] after manual    []  Ultrasound: []Continuous   [] Pulsed                           []1MHz   []3MHz Location:  W/cm2:    []  Iontophoresis with dexamethasone         Location: [] Take home patch   [] In clinic    []  Ice     []  heat  []  Ice massage  []  Laser   []  Anodyne Position:  Location:    []  Laser with stim  []  Other: Position:  Location:    []  Vasopneumatic Device Pressure:       [] lo [] med [] hi   Temperature: [] lo [] med [] hi   [x] Skin assessment post-treatment:  [x]intact []redness- no adverse reaction    []redness  adverse reaction:     12 min Therapeutic Activity:  [x] See flow sheet : RDLs, functional carrying exercises/activities   Rationale: increase ROM and increase strength to improve the patients ability to perform ADLs    29 min Neuromuscular Re-education:  [x]  See flow sheet : core and glute stability exercises   Rationale: increase strength, improve coordination, improve balance and increase proprioception  to improve the patients ability to tolerate functional activities. 2 min Manual Therapy: in supine: pelvic alignment and leg length assessment   The manual therapy interventions were performed at a separate and distinct time from the therapeutic activities interventions. Rationale: decrease pain, increase ROM and increase tissue extensibility to improve activity tolerance. With   [] TE   [] TA   [] neuro   [] other: Patient Education: [x] Review HEP    [] Progressed/Changed HEP based on:   [] positioning   [] body mechanics   [] transfers   [] heat/ice application    [] other:      Other Objective/Functional Measures: Initiated exercises/interventions per flow sheet. Pain Level (0-10 scale) post treatment: 0    ASSESSMENT/Changes in Function: Reported no pain post session today. Pelvic alignment and left length WNL today but hypomobile left SIJ noted with AP mobilization through left ASIS. Pain reported to the left SIJ with AP mobilization of right ASIS. Needed mild cueing for RDLs and planks for proper form. She demonstrates improved pelvic alignment since the evaluation but demonstrates impaired B SIJ mobility. Educated pt that soreness post METs is normal and can occur. Continue POC as tolerated to improve core strength and pain.      Patient will continue to benefit from skilled PT services to modify and progress therapeutic interventions, address functional mobility deficits, address ROM deficits, address strength deficits, analyze and address soft tissue restrictions, analyze and cue movement patterns, analyze and modify body mechanics/ergonomics, assess and modify postural abnormalities, address imbalance/dizziness and instruct in home and community integration to attain remaining goals. []  See Plan of Care  []  See progress note/recertification  []  See Discharge Summary         Progress towards goals / Updated goals:  1. Pt will report compliance with HEP in order to supplement PT treatment   Eval = established   Current: reports compliance  Long Term Goals: To be accomplished in 10 treatments:  1. Pt will score at least 74 on FOTO in order to improve overall function, decrease pain, and facilitate return to PLOF. Eval = 65  2. Pt will demonstrate bilateral hip abduction and extension strength 5-/5 in order to increased ambulation distances in the community   Eval = bilaterally 4/5 into both planes  3. Pt will demonstrate level innominate for 3 consecutive treatments in order to improve overall quality of life and facilitate return to PLOF.     Eval = right upslip     Current: level today but impaired B SIJ mobility noted    PLAN  [x]  Upgrade activities as tolerated     [x]  Continue plan of care  [x]  Update interventions per flow sheet       []  Discharge due to:_  []  Other:_      Albert Joseph, PT 3/30/2021  11:00 AM    Future Appointments   Date Time Provider Debra Hammer   4/1/2021  9:45 AM Swathi Nur PTA [facility]   4/6/2021  9:45 AM Ida Bartlett, PT [facility]   4/8/2021  9:45 AM Swathi Nur PTA [facility]   4/13/2021  1:30 PM Cesilia Cerna, PT [facility]   4/15/2021  1:30 PM Swathi Nur, RODRIGUE [facility]   4/20/2021 10:30 AM Cesilia Cerna, PT [facility]   4/22/2021  9:45 AM Swathi Nur, PTA [facility]   4/26/2021  9:45 AM Swathi Nur, RODRIGUE [facility]   4/28/2021  9:45 AM Enrico Parker, Kim Costa, PT [facility]

## 2021-04-01 ENCOUNTER — HOSPITAL ENCOUNTER (OUTPATIENT)
Dept: PHYSICAL THERAPY | Age: 36
Discharge: HOME | End: 2021-04-01
Payer: OTHER GOVERNMENT

## 2021-04-01 PROCEDURE — 97110 THERAPEUTIC EXERCISES: CPT

## 2021-04-01 PROCEDURE — 97530 THERAPEUTIC ACTIVITIES: CPT

## 2021-04-01 PROCEDURE — 97112 NEUROMUSCULAR REEDUCATION: CPT

## 2021-04-01 NOTE — PROGRESS NOTES
PT DAILY TREATMENT NOTE     Patient Name: Josh Cox  Date:2021  : 1985  [x]  Patient  Verified  Payor:  / Plan: [employer] [address] AND DEPENDENTS / Product Type:  /    In time:944  Out time:1030  Total Treatment Time (min): 55  Visit #: 3 of 10    Treatment Area: Low back pain [M54.5]    SUBJECTIVE  Pain Level (0-10 scale): 0  Any medication changes, allergies to medications, adverse drug reactions, diagnosis change, or new procedure performed?: [x] No    [] Yes (see summary sheet for update)  Subjective functional status/changes:   [] No changes reported  \"I don't have any pain. \"    OBJECTIVE    Modality rationale: patient declined   Min Type Additional Details    [] Estim:  []Unatt       []IFC  []Premod                        []Other:  []w/ice   []w/heat  Position:  Location:    [] Estim: []Att    []TENS instruct  []NMES                    []Other:  []w/US   []w/ice   []w/heat  Position:  Location:    []  Traction: [] Cervical       []Lumbar                       [] Prone          []Supine                       []Intermittent   []Continuous Lbs:  [] before manual  [] after manual    []  Ultrasound: []Continuous   [] Pulsed                           []1MHz   []3MHz W/cm2:  Location:    []  Iontophoresis with dexamethasone         Location: [] Take home patch   [] In clinic    []  Ice     []  heat  []  Ice massage  []  Laser   []  Anodyne Position:  Location:    []  Laser with stim  []  Other:  Position:  Location:    []  Vasopneumatic Device Pressure:       [] lo [] med [] hi   Temperature: [] lo [] med [] hi   [] Skin assessment post-treatment:  []intact []redness- no adverse reaction    []redness  adverse reaction:     10 min Therapeutic Exercise:  [x] See flow sheet :   Rationale: increase ROM and increase strength to improve the patients ability to perform ADLs    26 min Therapeutic Activity:  [x]  See flow sheet : functional carrying activities, squatting mechanics   Rationale: increase ROM, increase strength, improve coordination, improve balance and increase proprioception  to improve the patients ability to improve mobility and ADL performance     10 min Neuromuscular Re-education:  [x]  See flow sheet : core stabilization activities   Rationale: increase ROM, increase strength, improve coordination, improve balance and increase proprioception  to improve the patients ability to improve mobility and upright posture        With   [x] TE   [x] TA   [x] neuro   [] other: Patient Education: [x] Review HEP    [] Progressed/Changed HEP based on:   [x] positioning   [x] body mechanics   [] transfers   [] heat/ice application    [] other:      Other Objective/Functional Measures:      Pain Level (0-10 scale) post treatment: 0    ASSESSMENT/Changes in Function: Pt presents with level pelvic alignment. Pt demonstrated good mechanics with squats and kettle bell swings after initial cuing. Was challenged with maintaining proper spinal alignment with RDLs. TA draw was challenged with bird dogs. Continue to progress as able. Patient will continue to benefit from skilled PT services to modify and progress therapeutic interventions, address functional mobility deficits, address ROM deficits, address strength deficits, analyze and address soft tissue restrictions, analyze and cue movement patterns, analyze and modify body mechanics/ergonomics, assess and modify postural abnormalities, address imbalance/dizziness and instruct in home and community integration to attain remaining goals. [x]  See Plan of Care  []  See progress note/recertification  []  See Discharge Summary         Progress towards goals / Updated goals:  1. Pt will report compliance with HEP in order to supplement PT treatment              Eval = established              Current: reports compliance  Long Term Goals: To be accomplished in 10 treatments:  1.    Pt will score at least 74 on FOTO in order to improve overall function, decrease pain, and facilitate return to PLOF. Eval = 65   Assess at 30 day juan  2. Pt will demonstrate bilateral hip abduction and extension strength 5-/5 in order to increased ambulation distances in the community              Eval = bilaterally 4/5 into both planes   Making progress  3. Pt will demonstrate level innominate for 3 consecutive treatments in order to improve overall quality of life and facilitate return to PLOF.                Eval = right upslip                Current: level today but impaired B SIJ mobility noted       PLAN  []  Upgrade activities as tolerated     [x]  Continue plan of care  []  Update interventions per flow sheet       []  Discharge due to:_  []  Other:_      Galo Hernandez, PTA, CSCS 4/1/2021  10:49 AM    Future Appointments   Date Time Provider Debra Hammer   4/6/2021  9:45 AM Erik Brock, PT [facility]   4/8/2021  9:45 AM Vinay Bennett PTA [facility]   4/13/2021  1:30 PM Maci Slaughter, PT [facility]   4/15/2021  1:30 PM Vinay Bennett PTA [facility]   4/20/2021 10:30 AM Maci Slaughter, PT [facility]   4/22/2021  9:45 AM Vinay Bennett PTA [facility]   4/26/2021  9:45 AM Vinay Bennett PTA [facility]   4/28/2021  9:45 AM Archie Suarez, PT [facility]

## 2021-04-06 ENCOUNTER — HOSPITAL ENCOUNTER (OUTPATIENT)
Dept: PHYSICAL THERAPY | Age: 36
Discharge: HOME | End: 2021-04-06
Payer: OTHER GOVERNMENT

## 2021-04-06 PROCEDURE — 97112 NEUROMUSCULAR REEDUCATION: CPT

## 2021-04-06 PROCEDURE — 97140 MANUAL THERAPY 1/> REGIONS: CPT

## 2021-04-06 PROCEDURE — 97110 THERAPEUTIC EXERCISES: CPT

## 2021-04-06 PROCEDURE — 97530 THERAPEUTIC ACTIVITIES: CPT

## 2021-04-06 NOTE — PROGRESS NOTES
PT DAILY TREATMENT NOTE     Patient Name: Padmini Rushing  Date:2021  : 1985  [x]  Patient  Verified  Payor:  / Plan: [facility]  ACTIVE DUTY AND DEPENDENTS / Product Type:  /    In time: 9:49   Out time: 10:35  Total Treatment Time (min): 46  Visit #: 4 of 10    Treatment Area: Low back pain [M54.5]    SUBJECTIVE  Pain Level (0-10 scale): 0  Any medication changes, allergies to medications, adverse drug reactions, diagnosis change, or new procedure performed?: [x] No    [] Yes (see summary sheet for update)  Subjective functional status/changes:   [] No changes reported  Pt reports she was sore and had mild discomfort after the last session. OBJECTIVE    10 min Therapeutic Activity:  [x]  See flow sheet : dynamic warm up, functional carrying activities   Rationale: increase ROM, increase strength, improve coordination, improve balance and increase proprioception  to improve the patients ability to improve mobility and ADL performance     21 min Neuromuscular Re-education:  [x]  See flow sheet : core and glute stabilization activities   Rationale: increase ROM, increase strength, improve coordination, improve balance and increase proprioception  to improve the patients ability to improve mobility and upright posture    15 min Manual Therapy: in supine: pelvic alignment and leg length assessments, B SIJ assessment through AP mobilization B ASIS; in B s/l: MET to correct sacral torsion; pt education on how pelvic alignment/SIJ mobility can affect her pain and mobility. The manual therapy interventions were performed at a separate and distinct time from the therapeutic activities interventions. Rationale: decrease pain, increase ROM, increase tissue extensibility and increase postural awareness to improve activity tolerance.          With   [x] TE   [x] TA   [x] neuro   [] other: Patient Education: [x] Review HEP    [] Progressed/Changed HEP based on:   [x] positioning   [x] body mechanics   [] transfers   [] heat/ice application    [] other:      Other Objective/Functional Measures: Mild valgus noted in B knees with gait today. Added butt burners to improve this valgus and glute stability. Decreased left SIJ mobility noted with AP left SIJ mobilization. B ASIS AP mobilization increased left SIJ pain. Pain Level (0-10 scale) post treatment: 0    ASSESSMENT/Changes in Function: Reported no pain post session today. Reported having left SIJ pain with ambulation on TM. Mild improvement in left SIJ pain with right s/l MET to correct sacral torsion but continued to report pain in the left SIJ. Pelvic alignment and leg length are WNL today but hypomobility is noted in the left SIJ and hypermobility is noted in the right SIJ. Challenged with fatigue and endurance with B butt burners and educated pt she can perform these at home. Continue POC as tolerated. Patient will continue to benefit from skilled PT services to modify and progress therapeutic interventions, address functional mobility deficits, address ROM deficits, address strength deficits, analyze and address soft tissue restrictions, analyze and cue movement patterns, analyze and modify body mechanics/ergonomics, assess and modify postural abnormalities, address imbalance/dizziness and instruct in home and community integration to attain remaining goals. []  See Plan of Care  []  See progress note/recertification  []  See Discharge Summary         Progress towards goals / Updated goals:  1. Pt will report compliance with HEP in order to supplement PT treatment              Eval = established              Current: reports compliance  Long Term Goals: To be accomplished in 10 treatments:  1. Pt will score at least 74 on FOTO in order to improve overall function, decrease pain, and facilitate return to PLOF. Eval = 65   Assess at 30 day juan  2.    Pt will demonstrate bilateral hip abduction and extension strength 5-/5 in order to increased ambulation distances in the community              Eval = bilaterally 4/5 into both planes   Making progress  3. Pt will demonstrate level innominate for 3 consecutive treatments in order to improve overall quality of life and facilitate return to [facility].                Eval = right upslip                Current: level today but impaired B SIJ mobility noted     PLAN  [x]  Upgrade activities as tolerated     [x]  Continue plan of care  [x]  Update interventions per flow sheet       []  Discharge due to:_  []  Other:_      Shanel Villavicencio, PT 4/6/2021  10:49 AM    Future Appointments   Date Time Provider Debra Hammer   4/8/2021  9:45 AM Kelly Ortiz PTA [facility]   4/13/2021  1:30 PM Alesia White, PT [facility]   4/15/2021  1:30 PM Kelly Ortiz PTA [facility]   4/20/2021 10:30 AM Alesia White, PT [facility]   4/22/2021  9:45 AM Kelly Ortiz PTA [facility]   4/26/2021  9:45 AM Kelly Ortiz PTA [facility]   4/28/2021  9:45 AM Romina Gray, PT [facility]

## 2021-04-08 ENCOUNTER — HOSPITAL ENCOUNTER (OUTPATIENT)
Dept: PHYSICAL THERAPY | Age: 36
Discharge: HOME | End: 2021-04-08
Payer: OTHER GOVERNMENT

## 2021-04-08 PROCEDURE — 97530 THERAPEUTIC ACTIVITIES: CPT

## 2021-04-08 PROCEDURE — 97110 THERAPEUTIC EXERCISES: CPT

## 2021-04-08 PROCEDURE — 97112 NEUROMUSCULAR REEDUCATION: CPT

## 2021-04-08 PROCEDURE — 97014 ELECTRIC STIMULATION THERAPY: CPT

## 2021-04-08 NOTE — PROGRESS NOTES
PT DAILY TREATMENT NOTE 11    Patient Name: Alek Tripp  Date:2021  : 1985  [x]  Patient  Verified  Payor:  / Plan: [facility]  ACTIVE DUTY AND DEPENDENTS / Product Type:  /    In time:942  Out time:1035  Total Treatment Time (min): 53  Visit #: 5 of 10    Treatment Area: Low back pain [M54.5]    SUBJECTIVE  Pain Level (0-10 scale): 2-3  Any medication changes, allergies to medications, adverse drug reactions, diagnosis change, or new procedure performed?: [x] No    [] Yes (see summary sheet for update)  Subjective functional status/changes:   [] No changes reported  \"I have just a little irritation in my back. \"    OBJECTIVE    Modality rationale: decrease pain and increase tissue extensibility to improve the patients ability to improve mobility and positional tolerance   Min Type Additional Details   10 [x] Estim:  [x]Unatt       [x]IFC  []Premod                        []Other:  []w/ice   [x]w/heat  Position: prone  Location: lower back    [] Estim: []Att    []TENS instruct  []NMES                    []Other:  []w/US   []w/ice   []w/heat  Position:  Location:    []  Traction: [] Cervical       []Lumbar                       [] Prone          []Supine                       []Intermittent   []Continuous Lbs:  [] before manual  [] after manual    []  Ultrasound: []Continuous   [] Pulsed                           []1MHz   []3MHz W/cm2:  Location:    []  Iontophoresis with dexamethasone         Location: [] Take home patch   [] In clinic    []  Ice     []  heat  []  Ice massage  []  Laser   []  Anodyne Position:  Location:    []  Laser with stim  []  Other:  Position:  Location:    []  Vasopneumatic Device Pressure:       [] lo [] med [] hi   Temperature: [] lo [] med [] hi   [x] Skin assessment post-treatment:  [x]intact []redness- no adverse reaction    []redness  adverse reaction:     8 min Therapeutic Exercise:  [x] See flow sheet :   Rationale: increase ROM and increase strength to improve the patients ability to perform ADLs    23 min Therapeutic Activity:  [x]  See flow sheet : functional carrying activities, squatting mechanics, dynamic warm up   Rationale: increase ROM, increase strength, improve coordination, improve balance and increase proprioception  to improve the patients ability to improve mobility and ADL performance     12 min Neuromuscular Re-education:  [x]  See flow sheet : core stabilization activities, MET to correct left anterior rotation of the innominate   Rationale: increase ROM, increase strength, improve coordination, improve balance and increase proprioception  to improve the patients ability to improve mobility and upright posture        With   [x] TE   [x] TA   [x] neuro   [] other: Patient Education: [x] Review HEP    [] Progressed/Changed HEP based on:   [x] positioning   [x] body mechanics   [] transfers   [] heat/ice application    [] other:      Other Objective/Functional Measures:      Pain Level (0-10 scale) post treatment: 2-3    ASSESSMENT/Changes in Function: Pt demonstrates good lifting mechanics with no cuing needed, but was challenged with maintaining TA during RDLs. TA challenged with addition of planking exercises. Reports an irritation in her lower back throughout treatment. Patient will continue to benefit from skilled PT services to modify and progress therapeutic interventions, address functional mobility deficits, address ROM deficits, address strength deficits, analyze and address soft tissue restrictions, analyze and cue movement patterns, analyze and modify body mechanics/ergonomics, assess and modify postural abnormalities, address imbalance/dizziness and instruct in home and community integration to attain remaining goals. [x]  See Plan of Care  []  See progress note/recertification  []  See Discharge Summary         Progress towards goals / Updated goals:   1.   Pt will report compliance with HEP in order to supplement PT treatment              Eval = established              KYTRIGP: reports compliance  Long Term Goals: To be accomplished in 10 treatments:  1.   Pt will score at least 74 on FOTO in order to improve overall function, decrease pain, and facilitate return to PLOF.             Eval = 65              Assess at 30 day juan  2.   Pt will demonstrate bilateral hip abduction and extension strength 5-/5 in order to increased ambulation distances in the community              Eval = bilaterally 4/5 into both planes              Making progress  3.   Pt will demonstrate level innominate for 3 consecutive treatments in order to improve overall quality of life and facilitate return to PLOF.                Eval = right upslip                Current: level today but impaired B SIJ mobility noted    PLAN  []  Upgrade activities as tolerated     [x]  Continue plan of care  []  Update interventions per flow sheet       []  Discharge due to:_  []  Other:_      Ras Foots, PTA 4/8/2021  10:39 AM    Future Appointments   Date Time Provider Debra Hammer   4/8/2021  9:45 AM Damaris Reyes PTA [facility]   4/13/2021  1:30 PM Juan C Nolasco, PT [facility]   4/15/2021  1:30 PM Damaris Reyes, PTA [facility]   4/20/2021 10:30 AM Juan C Nolasco, PT [facility]   4/22/2021  9:45 AM Damaris Reyes PTA [facility]   4/26/2021  9:45 AM Damaris Reyes PTA [facility]   4/28/2021  9:45 AM Justo Barker, PT [facility]

## 2021-04-13 ENCOUNTER — HOSPITAL ENCOUNTER (OUTPATIENT)
Dept: PHYSICAL THERAPY | Age: 36
Discharge: HOME | End: 2021-04-13
Payer: OTHER GOVERNMENT

## 2021-04-13 ENCOUNTER — APPOINTMENT (OUTPATIENT)
Dept: PHYSICAL THERAPY | Age: 36
End: 2021-04-13
Payer: OTHER GOVERNMENT

## 2021-04-13 PROCEDURE — 97110 THERAPEUTIC EXERCISES: CPT

## 2021-04-13 PROCEDURE — 97530 THERAPEUTIC ACTIVITIES: CPT

## 2021-04-13 PROCEDURE — 97112 NEUROMUSCULAR REEDUCATION: CPT

## 2021-04-13 NOTE — PROGRESS NOTES
PT DAILY TREATMENT NOTE     Patient Name: Gael Cast  Date:2021  : 1985  [x]  Patient  Verified  Payor: [facility] / Plan: [employer] [facility] [address] AND DEPENDENTS / Product Type: Merlinda Shruthi /    In time:1157 Out time:1235  Total Treatment Time (min): 38  Visit #: 6 of 10      Treatment Area: Low back pain [M54.5]    SUBJECTIVE  Pain Level (0-10 scale): 0  Any medication changes, allergies to medications, adverse drug reactions, diagnosis change, or new procedure performed?: [x] No    [] Yes (see summary sheet for update)  Subjective functional status/changes:   [] No changes reported  Patient reports no pain or irritation in her back today. OBJECTIVE    13 min Therapeutic Exercise:  [] See flow sheet :   Rationale: increase ROM and increase strength to improve the patients ability to increase activity tolerance    15 min Therapeutic Activity:  []  See flow sheet : functional carrying, squatting mechanics   Rationale: increase ROM, increase strength and improve coordination  to improve the patients ability to perform work duties     10 min Neuromuscular Re-education:  []  See flow sheet : core stabilization   Rationale: increase strength, improve coordination, improve balance and increase proprioception  to improve the patients ability to tolerate sustained postures        With   [] TE   [] TA   [] neuro   [] other: Patient Education: [x] Review HEP    [] Progressed/Changed HEP based on:   [] positioning   [] body mechanics   [] transfers   [] heat/ice application    [] other:      Other Objective/Functional Measures: level innominate    Pain Level (0-10 scale) post treatment: 0    ASSESSMENT/Changes in Function: Patient reported no pain upon arrival and had level hip alignment. Improved core activation with RDL today. Provided cuing for TA activation with plank bird dogs to correct hip rotation. Patient declined IFC today as she was not having any pain.     Patient will continue to benefit from skilled PT services to modify and progress therapeutic interventions, address functional mobility deficits, address ROM deficits, address strength deficits, analyze and address soft tissue restrictions, analyze and cue movement patterns, analyze and modify body mechanics/ergonomics, assess and modify postural abnormalities, address imbalance/dizziness and instruct in home and community integration to attain remaining goals. []  See Plan of Care  []  See progress note/recertification  []  See Discharge Summary         Progress towards goals / Updated goals: 1.   Pt will report compliance with HEP in order to supplement PT treatment              Eval = established              Current: reports compliance  Long Term Goals: To be accomplished in 10 treatments:  1.   Pt will score at least 74 on FOTO in order to improve overall function, decrease pain, and facilitate return to PLOF.             Eval = 65              Assess at 30 day juan  2.   Pt will demonstrate bilateral hip abduction and extension strength 5-/5 in order to increased ambulation distances in the community              Eval = bilaterally 4/5 into both planes              Making progress  3.   Pt will demonstrate level innominate for 3 consecutive treatments in order to improve overall quality of life and facilitate return to PLOF.                Eval = right upslip                Current: level today but impaired B SIJ mobility noted    PLAN  [x]  Upgrade activities as tolerated     []  Continue plan of care  []  Update interventions per flow sheet       []  Discharge due to:_  []  Other:_      Yana Molina PTA 4/13/2021  11:11 AM    Future Appointments   Date Time Provider Debra Hammer   4/13/2021 12:00 PM Britney Bella PTA [facility]   4/15/2021  1:30 PM Angeles Fam PTA [facility]   4/20/2021 10:30 AM Shaylee Barroso PT [facility]   4/22/2021  9:45 AM Angeles Fam PTA [facility]   4/26/2021  9:45 AM Apollo Coleman [facility]   4/28/2021  9:45 AM Velasquez Conteh PT [facility]

## 2021-04-15 ENCOUNTER — APPOINTMENT (OUTPATIENT)
Dept: PHYSICAL THERAPY | Age: 36
End: 2021-04-15
Payer: OTHER GOVERNMENT

## 2021-04-16 ENCOUNTER — HOSPITAL ENCOUNTER (OUTPATIENT)
Dept: PHYSICAL THERAPY | Age: 36
Discharge: HOME | End: 2021-04-16
Payer: OTHER GOVERNMENT

## 2021-04-16 PROCEDURE — 97530 THERAPEUTIC ACTIVITIES: CPT

## 2021-04-16 PROCEDURE — 97112 NEUROMUSCULAR REEDUCATION: CPT

## 2021-04-16 NOTE — PROGRESS NOTES
PT DAILY TREATMENT NOTE 11    Patient Name: Jesika Acevedo  Date:2021  : 1985  [x]  Patient  Verified  Payor:  / Plan: [facility]  ACTIVE DUTY AND DEPENDENTS / Product Type:  /    In time:900  Out time:949  Total Treatment Time (min): 49  Visit #: 7 of 10    Treatment Area: Low back pain [M54.5]    SUBJECTIVE  Pain Level (0-10 scale): 1-2  Any medication changes, allergies to medications, adverse drug reactions, diagnosis change, or new procedure performed?: [x] No    [] Yes (see summary sheet for update)  Subjective functional status/changes:   [] No changes reported  See PN    OBJECTIVE    23 min Therapeutic Activity:  [x]  See flow sheet : functional hip strength; ;reassessment   Rationale: increase strength and improve coordination  to improve the patients ability to play with her children     26 min Neuromuscular Re-education:  [x]  See flow sheet : core and glut jeannine; extension based exercises   Rationale: increase strength, improve coordination and increase proprioception  to improve the patients ability to tolerate sustained postures          With   [] TE   [] TA   [] neuro   [] other: Patient Education: [x] Review HEP    [] Progressed/Changed HEP based on:   [] positioning   [] body mechanics   [] transfers   [] heat/ice application    [] other:      Other Objective/Functional Measures: see PN; updated HEP; level innominate     Pain Level (0-10 scale) post treatment: 0    ASSESSMENT/Changes in Function: see PN     Patient will continue to benefit from skilled PT services to modify and progress therapeutic interventions, address functional mobility deficits, address ROM deficits, address strength deficits, analyze and address soft tissue restrictions, analyze and cue movement patterns, analyze and modify body mechanics/ergonomics, assess and modify postural abnormalities, address imbalance/dizziness and instruct in home and community integration to attain remaining goals.     []  See Plan of Care  [x]  See progress note/recertification  []  See Discharge Summary         Progress towards goals / Updated goals: 1.   Pt will score at least 74 on FOTO in order to improve overall function, decrease pain, and facilitate return to PLOF.             PN status: Progressing = 72  2.   Pt will demonstrate bilateral hip abduction and extension strength 5-/5 in order to increased ambulation distances in the community              PN status: Partially Met: right ext/abd = 5-/5, left abd = 4/5, ext = 4+/5  3.   Pt will demonstrate level innominate for 3 consecutive treatments in order to improve overall quality of life and facilitate return to PLOF.                HF status: Progressing = 2 consecutive treatments     PLAN  [x]  Upgrade activities as tolerated     [x]  Continue plan of care  []  Update interventions per flow sheet       []  Discharge due to:_  []  Other:_      Gerry Mora, PT 4/16/2021  11:39 AM    Future Appointments   Date Time Provider Debra Hammer   4/20/2021 10:30 AM Maci Slaughter, PT [facility]   4/22/2021  9:45 AM Vinay Bennett PTA [facility]   4/26/2021  9:45 AM Vinay Bennett PTA [facility]   4/28/2021  9:45 AM Amparo Tapia, PT [facility]

## 2021-04-16 NOTE — PROGRESS NOTES
[facility] [address], [address] [phone] fax    Progress Note  Patient name: Aleja Ordonez Start of Care: 3/19/2021   Referral source: Shantell Powell MD : 1985                Medical Diagnosis: Low back pain [M54.5]  Payor:  / [facility] [employer] DEPENDENTS / Product Type: Matty [place] /  Onset Date:chronic with exacerbation                 Treatment Diagnosis: LBP   Prior Hospitalization: see medical history Provider#: [id number]   Medications: Verified on Patient summary List    Comorbidities: , chronicity of condition   Prior Level of Function: functionally (I); mother of 2, active running 3-4x/week for ~2miles    Visits from Start of Care: 7    Missed Visits: 0    Established Goals: 1.   Pt will report compliance with HEP in order to supplement PT treatment              Eval = established              MET    Long Term Goals: To be accomplished in 10 treatments:  1.   Pt will score at least 74 on FOTO in order to improve overall function, decrease pain, and facilitate return to PLOF.             Eval = 72              Progressing = 72  2.   Pt will demonstrate bilateral hip abduction and extension strength 5-/5 in order to increased ambulation distances in the community              Eval = bilaterally 4/5 into both planes              Partially Met: right ext/abd = 5-/5, left abd = 4/5, ext = 4+/5  3.   Pt will demonstrate level innominate for 3 consecutive treatments in order to improve overall quality of life and facilitate return to PLOF.                Eval = right upslip                Progressing = 2 consecutive treatments     Key Functional Changes:   Functional Gains: improved strength, no episodes (just general agitation), decreased pain with walking  Functional Deficits: pain after activity, tightness with bending   % improvement: 75%  Pain   Average: -2/10       Best: 0/10     Worst: 4/10  Patient Goal: \"to get my back unlocked and know what to do when it starts hurting again; know how to correct it myself. \"    Updated Goals: to be achieved in 4 weeks: 1.   Pt will score at least 74 on FOTO in order to improve overall function, decrease pain, and facilitate return to PLOF.             PN status: Progressing = 72  2.   Pt will demonstrate bilateral hip abduction and extension strength 5-/5 in order to increased ambulation distances in the community              PN status: Partially Met: right ext/abd = 5-/5, left abd = 4/5, ext = 4+/5  3.   Pt will demonstrate level innominate for 3 consecutive treatments in order to improve overall quality of life and facilitate return to PLOF.             PN status: Progressing = 2 consecutive treatments     ASSESSMENT/RECOMMENDATIONS: Ms. Jaeger Must reports 75% improvement since beginning therapy. Pt denies any recent exacerbations but continues to have stiffness and pain on the left side. Pt responding well in clinic today to extension and lateral hip stabilization exercises for relief of \"locked up\" pain. She demonstrates improved right hip strength, but left remains reduced. Updated HEP today;  Pt will benefit from continued skilled PT in order to address remaining deficits and maximize functional potential.    [x]Continue therapy per initial plan/protocol at a frequency of  1-2 x per week for 4 weeks  []Continue therapy with the following recommended changes:_____________________      _____________________________________________________________________  []Discontinue therapy progressing towards or have reached established goals  []Discontinue therapy due to lack of appreciable progress towards goals  []Discontinue therapy due to lack of attendance or compliance  []Await Physician's recommendations/decisions regarding therapy  []Other:________________________________________________________________    Thank you for this referral.    Rose Souza RICHARD Bosch, PT 4/16/2021 9:15 AM  NOTE TO PHYSICIAN:  PLEASE COMPLETE THE ORDERS BELOW AND   FAX TO [facility]: [phone]  If you are unable to process this request in 24 hours please contact our office: [phone]    []  I have read the above report and request that my patient continue as recommended. []  I have read the above report and request that my patient continue therapy with the following changes/special instructions:________________________________________  [] I have read the above report and request that my patient be discharged from therapy.     Physician's Signature:____________Date:_________TIME:________     Rigoberto Andres MD  ** Signature, Date and Time must be completed for valid certification **

## 2021-04-20 ENCOUNTER — HOSPITAL ENCOUNTER (OUTPATIENT)
Dept: PHYSICAL THERAPY | Age: 36
Discharge: HOME | End: 2021-04-20
Payer: OTHER GOVERNMENT

## 2021-04-20 PROCEDURE — 97112 NEUROMUSCULAR REEDUCATION: CPT

## 2021-04-20 PROCEDURE — 97530 THERAPEUTIC ACTIVITIES: CPT

## 2021-04-20 PROCEDURE — 97110 THERAPEUTIC EXERCISES: CPT

## 2021-04-20 NOTE — PROGRESS NOTES
PT DAILY TREATMENT NOTE     Patient Name: Padmini Rushing  Date:2021  : 1985  [x]  Patient  Verified  Payor:  / Plan: [employer] [facility] [address] AND DEPENDENTS / Product Type:  /    In GHWR:[id number]  Out time:1117  Total Treatment Time (min): 45  Visit #: 1 of 8    Treatment Area: Low back pain [M54.5]    SUBJECTIVE  Pain Level (0-10 scale): 0  Any medication changes, allergies to medications, adverse drug reactions, diagnosis change, or new procedure performed?: [x] No    [] Yes (see summary sheet for update)  Subjective functional status/changes:   [] No changes reported  Pt reports she has not had any pain since her last visit. OBJECTIVE    10 min Therapeutic Exercise:  [x] See flow sheet :   Rationale: increase ROM and increase strength to improve the patients ability to perform ADLs    12 min Therapeutic Activity:  [x]  See flow sheet : squatting mechanics, functional hip strength    Rationale: increase strength and improve coordination  to improve the patients ability to play with children     23 min Neuromuscular Re-education:  [x]  See flow sheet : core and glut jeannine; extension based exercises   Rationale: increase strength, improve coordination and increase proprioception  to improve the patients ability to tolerate sustained postures         With   [] TE   [] TA   [] neuro   [] other: Patient Education: [x] Review HEP    [] Progressed/Changed HEP based on:   [] positioning   [] body mechanics   [] transfers   [] heat/ice application    [] other:      Other Objective/Functional Measures: progressed exercises per flow sheet; level innominate     Pain Level (0-10 scale) post treatment: 0    ASSESSMENT/Changes in Function: Pt reported irritation with dynamic flexion HS stretch; alleviated with standing extension. Pt also aggravated by flexed knee prone press up; extended knees with reduction of central back irritation.  Pt tolerated progression of exercises with out increase in symptoms. Distributed 3 stage return to run program. Level innominate today, meeting goal #3 for 3 consecutive treatments. Patient will continue to benefit from skilled PT services to modify and progress therapeutic interventions, address functional mobility deficits, address ROM deficits, address strength deficits, analyze and address soft tissue restrictions, analyze and cue movement patterns, analyze and modify body mechanics/ergonomics, assess and modify postural abnormalities, address imbalance/dizziness and instruct in home and community integration to attain remaining goals. []  See Plan of Care  []  See progress note/recertification  []  See Discharge Summary         Progress towards goals / Updated goals: 1.   Pt will score at least 74 on FOTO in order to improve overall function, decrease pain, and facilitate return to PLOF.             PN status: Progressing = 72   To be reassessed at end of POC  2.   Pt will demonstrate bilateral hip abduction and extension strength 5-/5 in order to increased ambulation distances in the community              PN status: Partially Met: right ext/abd = 5-/5, left abd = 4/5, ext = 4+/5   Tolerating progressing   3.   Pt will demonstrate level innominate for 3 consecutive treatments in order to improve overall quality of life and facilitate return to PLOF.                N[place] status: Progressing = 2 consecutive treatments    MET = 3 consecutive treatments    PLAN  [x]  Upgrade activities as tolerated     [x]  Continue plan of care  []  Update interventions per flow sheet       []  Discharge due to:_  []  Other:_      Anil Lafleur, PT 4/20/2021  10:40 AM    Future Appointments   Date Time Provider Debra Hammer   4/22/2021  9:45 AM Isabela Angela PTA [facility]   4/26/2021  9:45 AM [facility]   4/28/2021  9:45 AM Saige Jeronimo, PT [facility] Statement Selected

## 2021-04-22 ENCOUNTER — HOSPITAL ENCOUNTER (OUTPATIENT)
Dept: PHYSICAL THERAPY | Age: 36
Discharge: HOME | End: 2021-04-22
Payer: OTHER GOVERNMENT

## 2021-04-22 PROCEDURE — 97530 THERAPEUTIC ACTIVITIES: CPT

## 2021-04-22 PROCEDURE — 97112 NEUROMUSCULAR REEDUCATION: CPT

## 2021-04-22 PROCEDURE — 97110 THERAPEUTIC EXERCISES: CPT

## 2021-04-22 NOTE — PROGRESS NOTES
PT DAILY TREATMENT NOTE     Patient Name: Mitzy Zazueta  Date:2021  : 1985  [x]  Patient  Verified  Payor:  / Plan: [employer] [facility] [address] AND DEPENDENTS / Product Type:  /    In time:941  Out time:1022  Total Treatment Time (min): 41  Visit #: 2 of 8    Treatment Area: Low back pain [M54.5]    SUBJECTIVE  Pain Level (0-10 scale): 0  Any medication changes, allergies to medications, adverse drug reactions, diagnosis change, or new procedure performed?: [x] No    [] Yes (see summary sheet for update)  Subjective functional status/changes:   [] No changes reported  \"No pain. \"    OBJECTIVE    Modality rationale: patient declined   Min Type Additional Details    [] Estim:  []Unatt       []IFC  []Premod                        []Other:  []w/ice   []w/heat  Position:  Location:    [] Estim: []Att    []TENS instruct  []NMES                    []Other:  []w/US   []w/ice   []w/heat  Position:  Location:    []  Traction: [] Cervical       []Lumbar                       [] Prone          []Supine                       []Intermittent   []Continuous Lbs:  [] before manual  [] after manual    []  Ultrasound: []Continuous   [] Pulsed                           []1MHz   []3MHz W/cm2:  Location:    []  Iontophoresis with dexamethasone         Location: [] Take home patch   [] In clinic    []  Ice     []  heat  []  Ice massage  []  Laser   []  Anodyne Position:  Location:    []  Laser with stim  []  Other:  Position:  Location:    []  Vasopneumatic Device Pressure:       [] lo [] med [] hi   Temperature: [] lo [] med [] hi   [] Skin assessment post-treatment:  []intact []redness- no adverse reaction    []redness  adverse reaction:     8 min Therapeutic Exercise:  [x] See flow sheet :   Rationale: increase ROM and increase strength to improve the patients ability to perform ADLs    10 min Therapeutic Activity:  [x]  See flow sheet : squatting mechanics, functional hip strength    Rationale: increase ROM, increase strength, improve coordination, improve balance and increase proprioception  to improve the patients ability to improve mobility and ADL performance     23 min Neuromuscular Re-education:  [x]  See flow sheet : core and glut jeannine; extension based exercises   Rationale: increase ROM, increase strength, improve coordination, improve balance and increase proprioception  to improve the patients ability to improve mobility and ability to perform sustained postures        With   [x] TE   [x] TA   [x] neuro   [] other: Patient Education: [x] Review HEP    [] Progressed/Changed HEP based on:   [x] positioning   [x] body mechanics   [] transfers   [] heat/ice application    [] other:      Other Objective/Functional Measures:      Pain Level (0-10 scale) post treatment: 0    ASSESSMENT/Changes in Function: Pt was challenged with progression of core stabilization activities. Completed [employer] swimmer push up modified from knees. Pt was fatigued by the end of treatment and needed multiple cuing to improve mechanics with Slovenian and gladiator get ups. Patient will continue to benefit from skilled PT services to modify and progress therapeutic interventions, address functional mobility deficits, address ROM deficits, address strength deficits, analyze and address soft tissue restrictions, analyze and cue movement patterns, analyze and modify body mechanics/ergonomics, assess and modify postural abnormalities, address imbalance/dizziness and instruct in home and community integration to attain remaining goals. [x]  See Plan of Care  []  See progress note/recertification  []  See Discharge Summary         Progress towards goals / Updated goals: 1.   Pt will score at least 74 on FOTO in order to improve overall function, decrease pain, and facilitate return to PLOF.               NH status: Progressing = 72              To be reassessed at end of POC  2.   Pt will demonstrate bilateral hip abduction and extension strength 5-/5 in order to increased ambulation distances in the community              PN status: Partially Met: right ext/abd = 5-/5, left abd = 4/5, ext = 4+/5              Tolerating progressing   3.   Pt will demonstrate level innominate for 3 consecutive treatments in order to improve overall quality of life and facilitate return to OF.                DOMINICK status: Progressing = 2 consecutive treatments               MET = 3 consecutive treatments    PLAN  []  Upgrade activities as tolerated     [x]  Continue plan of care  []  Update interventions per flow sheet       []  Discharge due to:_  []  Other:_      Gege Caal, RODRIGUE, CSCS 4/22/2021  10:34 AM    Future Appointments   Date Time Provider Debra Hammer   4/22/2021  9:45 AM [facility]   4/26/2021  9:45 AM [facility]   4/28/2021  9:45 AM [facility]rick[facility]

## 2021-04-26 ENCOUNTER — HOSPITAL ENCOUNTER (OUTPATIENT)
Dept: PHYSICAL THERAPY | Age: 36
Discharge: HOME | End: 2021-04-26
Payer: OTHER GOVERNMENT

## 2021-04-26 PROCEDURE — 97112 NEUROMUSCULAR REEDUCATION: CPT

## 2021-04-26 PROCEDURE — 97530 THERAPEUTIC ACTIVITIES: CPT

## 2021-04-26 PROCEDURE — 97110 THERAPEUTIC EXERCISES: CPT

## 2021-04-26 NOTE — PROGRESS NOTES
PT DAILY TREATMENT NOTE     Patient Name: Humza Pierre  Date:2021  : 1985  [x]  Patient  Verified  Payor:  / Plan: [facility]  ACTIVE DUTY AND DEPENDENTS / Product Type:  /    In time:944  Out time:1025  Total Treatment Time (min): 41  Visit #: 3 of 8    Treatment Area: Low back pain [M54.5]    SUBJECTIVE  Pain Level (0-10 scale): 1-2  Any medication changes, allergies to medications, adverse drug reactions, diagnosis change, or new procedure performed?: [x] No    [] Yes (see summary sheet for update)  Subjective functional status/changes:   [] No changes reported  \"I did some yard work this weekend and I think that aggravated my back some. \"    OBJECTIVE    Modality rationale: patient declined   Min Type Additional Details    [] Estim:  []Unatt       []IFC  []Premod                        []Other:  []w/ice   []w/heat  Position:  Location:    [] Estim: []Att    []TENS instruct  []NMES                    []Other:  []w/US   []w/ice   []w/heat  Position:  Location:    []  Traction: [] Cervical       []Lumbar                       [] Prone          []Supine                       []Intermittent   []Continuous Lbs:  [] before manual  [] after manual    []  Ultrasound: []Continuous   [] Pulsed                           []1MHz   []3MHz W/cm2:  Location:    []  Iontophoresis with dexamethasone         Location: [] Take home patch   [] In clinic    []  Ice     []  heat  []  Ice massage  []  Laser   []  Anodyne Position:  Location:    []  Laser with stim  []  Other:  Position:  Location:    []  Vasopneumatic Device Pressure:       [] lo [] med [] hi   Temperature: [] lo [] med [] hi   [] Skin assessment post-treatment:  []intact []redness- no adverse reaction    []redness  adverse reaction:     8 min Therapeutic Exercise:  [x] See flow sheet :   Rationale: increase ROM and increase strength to improve the patients ability to perform ADLs    8 min Therapeutic Activity:  [x]  See flow sheet : squatting mechanics, functional hip strength    Rationale: increase ROM and increase strength  to improve the patients ability to perform ADLs     25 min Neuromuscular Re-education:  [x]  See flow sheet :core and glut jeannine; extension based exercises    Rationale: increase ROM, increase strength, improve coordination, improve balance and increase proprioception  to improve the patients ability to improve mobility and ADL performance        With   [x] TE   [x] TA   [x] neuro   [] other: Patient Education: [x] Review HEP    [] Progressed/Changed HEP based on:   [x] positioning   [x] body mechanics   [] transfers   [] heat/ice application    [] other:      Other Objective/Functional Measures:      Pain Level (0-10 scale) post treatment: 0    ASSESSMENT/Changes in Function: Pt is making tremendous progress with her core stability and overall strength. Demonstrates good lifting mechanics. Continues to be challenged with more advanced core strengthening exercises. Patient will continue to benefit from skilled PT services to modify and progress therapeutic interventions, address functional mobility deficits, address ROM deficits, address strength deficits, analyze and address soft tissue restrictions, analyze and cue movement patterns, analyze and modify body mechanics/ergonomics, assess and modify postural abnormalities, address imbalance/dizziness and instruct in home and community integration to attain remaining goals. [x]  See Plan of Care  []  See progress note/recertification  []  See Discharge Summary         Progress towards goals / Updated goals: 1.   Pt will score at least 74 on FOTO in order to improve overall function, decrease pain, and facilitate return to PLOF.               GH status: Progressing = 72              BY be reassessed at end of POC  2.   Pt will demonstrate bilateral hip abduction and extension strength 5-/5 in order to increased ambulation distances in the [facility]              PN status: Partially Met: right ext/abd = 5-/5, left abd = 4/5, ext = 4+/5              Tolerating progressing   3.   Pt will demonstrate level innominate for 3 consecutive treatments in order to improve overall quality of life and facilitate return to [facility].                DZ status: Progressing = 2 consecutive treatments               MET = 3 consecutive treatments    PLAN  []  Upgrade activities as tolerated     [x]  Continue plan of care  []  Update interventions per flow sheet       []  Discharge due to:_  []  Other:_      Rowdy Sic, PTA, CSCS 4/26/2021  10:36 AM    Future Appointments   Date Time Provider Debra Hammer   4/28/2021  9:45 AM [facility]

## 2021-04-28 ENCOUNTER — HOSPITAL ENCOUNTER (OUTPATIENT)
Dept: PHYSICAL THERAPY | Age: 36
Discharge: HOME | End: 2021-04-28
Payer: OTHER GOVERNMENT

## 2021-04-28 PROCEDURE — 97112 NEUROMUSCULAR REEDUCATION: CPT

## 2021-04-28 PROCEDURE — 97530 THERAPEUTIC ACTIVITIES: CPT

## 2021-04-28 PROCEDURE — 97110 THERAPEUTIC EXERCISES: CPT

## 2021-04-28 NOTE — PROGRESS NOTES
PT DISCHARGE DAILY NOTE AND [id number]    Patient name: Shira Chen Start of Care: 3/19/2021   Referral source: Jacki Ma MD : 1985                Medical Diagnosis: Low back pain [M54.5]  Payor:  / Plan: [employer] B DEPENDENTS / Product Type:  /  Onset Date:chronic with exacerbation                 Treatment Diagnosis: LBP   Prior Hospitalization: see medical history Provider#: [id number]   Medications: Verified on Patient summary List    Comorbidities: , chronicity of condition   Prior Level of Function: functionally (I); mother of 2, active running 3-4x/week for ~2miles    Visits from Start of Care: 11    Missed Visits: 0    Reporting Period : 21 to 21    Date:2021  : 1985  [x]  Patient  Verified  Payor:  / Plan: [employer] B DEPENDENTS / Product Type:  /    In time:946  Out time:1024  Total Treatment Time (min): 38  Visit #: 4 of 8    SUBJECTIVE  Pain Level (0-10 scale): 0  Any medication changes, allergies to medications, adverse drug reactions, diagnosis change, or new procedure performed?: [x] No    [] Yes (see summary sheet for update)  Subjective functional status/changes:   [] No changes reported  Pt reports she had s    OBJECTIVE    13 min Therapeutic Exercise:  [x] See flow sheet : explanation, demonstration, performance and distribution of HEP   Rationale: increase ROM and increase strength to improve the patients ability to perform ADLs    10 min Therapeutic Activity:  [x]  See flow sheet : reassessment, functional hip strength    Rationale: increase strength and improve coordination  to improve the patients ability to play with her children     15 min Neuromuscular Re-education:  [x]  See flow sheet : core stabilization    Rationale: increase strength, improve coordination and increase proprioception  to improve the patients ability to tolerate sustained postures          With [] TE   [] TA   [] neuro   [] other: Patient Education: [x] Review HEP    [] Progressed/Changed HEP based on:   [] positioning   [] body mechanics   [] transfers   [] heat/ice application    [] other:      Other Objective/Functional Measures:  Bilateral hip abd/ext = 5/5    Pain Level (0-10 scale) post treatment: 0    Summary of Care: 1.   Pt will score at least 74 on FOTO in order to improve overall function, decrease pain, and facilitate return to PLOF.             PN status: Progressing = 72              QM be reassessed at end of POC  2.   Pt will demonstrate bilateral hip abduction and extension strength 5-/5 in order to increased ambulation distances in the community              PN status: Partially Met: right ext/abd = 5-/5, left abd = 4/5, ext = 4+/5             MET 5/5 bilaterally into both planes   3.   Pt will demonstrate level innominate for 3 consecutive treatments in order to improve overall quality of life and facilitate return to PLOF.             WE status: Progressing = 2 consecutive treatments               MET = 3 consecutive treatments       ASSESSMENT/Changes in Function: Ms. Niall Roy reports significant improvements in her ability to manage back pain. She was able to move furniture and lift over the weekend with intermittent symptoms she is able to mediate with HEP. Pt is compliant and confident in HEP and has met all PT goals; due to meeting current maximal therapeutic potential and pt request, she is DC to an updated HEP at this time.      Thank you for this referral!      PLAN  [x]Discontinue therapy: [x]Patient has reached or is progressing toward set goals      []Patient is non-compliant or has abdicated      []Due to lack of appreciable progress towards set [address], PT 4/28/2021  10:04 AM

## 2021-04-28 NOTE — PROGRESS NOTES
Physical Therapy Discharge Instructions    [facility] [place] [address]  [address], [address] [phone] fax    Patient: Luis Alberto Wu  : 1985    Continue Home Exercise Program 1 times per day for 2-3 weeks, then decrease to 3-4 times per week      Continue with    [x] Ice  as needed      [x] Heat           Follow up with MD:     [] Upon completion of therapy     [x] As needed    Recommendations:     [x]   Return to activity with home program    []   Return to activity with the following modifications:       []Post Rehab Program    []Join Independent aquatic program     []Return to/join local gym      Additional Comments: Keep up the good work. Let us know if you have any questions!       Janey Moreno, PT 2021 10:14 AM

## 2022-06-09 PROCEDURE — 99285 EMERGENCY DEPT VISIT HI MDM: CPT

## 2022-06-09 PROCEDURE — 96374 THER/PROPH/DIAG INJ IV PUSH: CPT

## 2022-06-10 ENCOUNTER — HOSPITAL ENCOUNTER (EMERGENCY)
Age: 37
Discharge: HOME | End: 2022-06-10
Attending: EMERGENCY MEDICINE
Payer: OTHER GOVERNMENT

## 2022-06-10 ENCOUNTER — APPOINTMENT (OUTPATIENT)
Dept: GENERAL RADIOLOGY | Age: 37
End: 2022-06-10
Attending: PHYSICIAN ASSISTANT
Payer: OTHER GOVERNMENT

## 2022-06-10 VITALS
HEIGHT: 66 IN | WEIGHT: 150 LBS | SYSTOLIC BLOOD PRESSURE: 135 MMHG | DIASTOLIC BLOOD PRESSURE: 89 MMHG | OXYGEN SATURATION: 96 % | TEMPERATURE: 98.4 F | RESPIRATION RATE: 18 BRPM | HEART RATE: 103 BPM | BODY MASS INDEX: 24.11 KG/M2

## 2022-06-10 DIAGNOSIS — F41.1 ANXIETY STATE: ICD-10-CM

## 2022-06-10 DIAGNOSIS — J01.90 ACUTE NON-RECURRENT SINUSITIS, UNSPECIFIED LOCATION: ICD-10-CM

## 2022-06-10 DIAGNOSIS — R07.89 CHEST TIGHTNESS: ICD-10-CM

## 2022-06-10 DIAGNOSIS — R00.2 PALPITATIONS: Primary | ICD-10-CM

## 2022-06-10 LAB
ALBUMIN SERPL-MCNC: 3.9 G/DL (ref 3.4–5)
ALBUMIN/GLOB SERPL: 1 {RATIO} (ref 0.8–1.7)
ALP SERPL-CCNC: 63 U/L (ref 45–117)
ALT SERPL-CCNC: 16 U/L (ref 13–56)
ANION GAP SERPL CALC-SCNC: 6 MMOL/L (ref 3–18)
APPEARANCE UR: CLEAR
AST SERPL-CCNC: 17 U/L (ref 10–38)
ATRIAL RATE: 105 BPM
BASOPHILS # BLD: 0 K/UL (ref 0–0.1)
BASOPHILS NFR BLD: 0 % (ref 0–2)
BILIRUB SERPL-MCNC: 0.2 MG/DL (ref 0.2–1)
BILIRUB UR QL: NEGATIVE
BUN SERPL-MCNC: 10 MG/DL (ref 7–18)
BUN/CREAT SERPL: 9 (ref 12–20)
CALCIUM SERPL-MCNC: 9.2 MG/DL (ref 8.5–10.1)
CALCULATED P AXIS, ECG09: 72 DEGREES
CALCULATED R AXIS, ECG10: 89 DEGREES
CALCULATED T AXIS, ECG11: 41 DEGREES
CHLORIDE SERPL-SCNC: 103 MMOL/L (ref 100–111)
CO2 SERPL-SCNC: 26 MMOL/L (ref 21–32)
COLOR UR: YELLOW
CREAT SERPL-MCNC: 1.07 MG/DL (ref 0.6–1.3)
DIAGNOSIS, 93000: NORMAL
DIFFERENTIAL METHOD BLD: ABNORMAL
EOSINOPHIL # BLD: 0 K/UL (ref 0–0.4)
EOSINOPHIL NFR BLD: 0 % (ref 0–5)
ERYTHROCYTE [DISTWIDTH] IN BLOOD BY AUTOMATED COUNT: 11.9 % (ref 11.6–14.5)
GLOBULIN SER CALC-MCNC: 3.9 G/DL (ref 2–4)
GLUCOSE SERPL-MCNC: 116 MG/DL (ref 74–99)
GLUCOSE UR STRIP.AUTO-MCNC: NEGATIVE MG/DL
HCG UR QL: NEGATIVE
HCT VFR BLD AUTO: 33.7 % (ref 35–45)
HGB BLD-MCNC: 11.4 G/DL (ref 12–16)
HGB UR QL STRIP: NEGATIVE
IMM GRANULOCYTES # BLD AUTO: 0 K/UL (ref 0–0.04)
IMM GRANULOCYTES NFR BLD AUTO: 0 % (ref 0–0.5)
KETONES UR QL STRIP.AUTO: NEGATIVE MG/DL
LEUKOCYTE ESTERASE UR QL STRIP.AUTO: NEGATIVE
LIPASE SERPL-CCNC: 99 U/L (ref 73–393)
LYMPHOCYTES # BLD: 1.1 K/UL (ref 0.9–3.6)
LYMPHOCYTES NFR BLD: 12 % (ref 21–52)
MCH RBC QN AUTO: 30 PG (ref 24–34)
MCHC RBC AUTO-ENTMCNC: 33.8 G/DL (ref 31–37)
MCV RBC AUTO: 88.7 FL (ref 78–100)
MONOCYTES # BLD: 0.4 K/UL (ref 0.05–1.2)
MONOCYTES NFR BLD: 4 % (ref 3–10)
NEUTS SEG # BLD: 7.2 K/UL (ref 1.8–8)
NEUTS SEG NFR BLD: 83 % (ref 40–73)
NITRITE UR QL STRIP.AUTO: NEGATIVE
NRBC # BLD: 0 K/UL (ref 0–0.01)
NRBC BLD-RTO: 0 PER 100 WBC
P-R INTERVAL, ECG05: 152 MS
PH UR STRIP: 6 [PH] (ref 5–8)
PLATELET # BLD AUTO: 293 K/UL (ref 135–420)
PMV BLD AUTO: 10.5 FL (ref 9.2–11.8)
POTASSIUM SERPL-SCNC: 4.1 MMOL/L (ref 3.5–5.5)
PROT SERPL-MCNC: 7.8 G/DL (ref 6.4–8.2)
PROT UR STRIP-MCNC: NEGATIVE MG/DL
Q-T INTERVAL, ECG07: 334 MS
QRS DURATION, ECG06: 80 MS
QTC CALCULATION (BEZET), ECG08: 441 MS
RBC # BLD AUTO: 3.8 M/UL (ref 4.2–5.3)
SODIUM SERPL-SCNC: 135 MMOL/L (ref 136–145)
SP GR UR REFRACTOMETRY: 1.01 (ref 1–1.03)
TROPONIN-HIGH SENSITIVITY: <3 NG/L (ref 0–54)
UROBILINOGEN UR QL STRIP.AUTO: 0.2 EU/DL (ref 0.2–1)
VENTRICULAR RATE, ECG03: 105 BPM
WBC # BLD AUTO: 8.8 K/UL (ref 4.6–13.2)

## 2022-06-10 PROCEDURE — 81003 URINALYSIS AUTO W/O SCOPE: CPT

## 2022-06-10 PROCEDURE — 80053 COMPREHEN METABOLIC PANEL: CPT

## 2022-06-10 PROCEDURE — 84484 ASSAY OF TROPONIN QUANT: CPT

## 2022-06-10 PROCEDURE — 74011000250 HC RX REV CODE- 250: Performed by: PHYSICIAN ASSISTANT

## 2022-06-10 PROCEDURE — 83690 ASSAY OF LIPASE: CPT

## 2022-06-10 PROCEDURE — 85025 COMPLETE CBC W/AUTO DIFF WBC: CPT

## 2022-06-10 PROCEDURE — 74011250637 HC RX REV CODE- 250/637: Performed by: PHYSICIAN ASSISTANT

## 2022-06-10 PROCEDURE — 93005 ELECTROCARDIOGRAM TRACING: CPT

## 2022-06-10 PROCEDURE — 74011250636 HC RX REV CODE- 250/636: Performed by: PHYSICIAN ASSISTANT

## 2022-06-10 PROCEDURE — 71045 X-RAY EXAM CHEST 1 VIEW: CPT

## 2022-06-10 PROCEDURE — 81025 URINE PREGNANCY TEST: CPT

## 2022-06-10 PROCEDURE — 96374 THER/PROPH/DIAG INJ IV PUSH: CPT

## 2022-06-10 RX ORDER — AMOXICILLIN AND CLAVULANATE POTASSIUM 875; 125 MG/1; MG/1
1 TABLET, FILM COATED ORAL 2 TIMES DAILY
Qty: 20 TABLET | Refills: 0 | Status: SHIPPED | OUTPATIENT
Start: 2022-06-10

## 2022-06-10 RX ORDER — FAMOTIDINE 20 MG/1
20 TABLET, FILM COATED ORAL 2 TIMES DAILY
Qty: 20 TABLET | Refills: 0 | Status: SHIPPED | OUTPATIENT
Start: 2022-06-10 | End: 2022-06-20

## 2022-06-10 RX ORDER — ONDANSETRON 4 MG/1
4 TABLET, ORALLY DISINTEGRATING ORAL
Status: COMPLETED | OUTPATIENT
Start: 2022-06-10 | End: 2022-06-10

## 2022-06-10 RX ORDER — LORAZEPAM 0.5 MG/1
0.5 TABLET ORAL
Status: COMPLETED | OUTPATIENT
Start: 2022-06-10 | End: 2022-06-10

## 2022-06-10 RX ADMIN — ONDANSETRON 4 MG: 4 TABLET, ORALLY DISINTEGRATING ORAL at 01:34

## 2022-06-10 RX ADMIN — LIDOCAINE HYDROCHLORIDE 40 ML: 20 SOLUTION ORAL; TOPICAL at 03:03

## 2022-06-10 RX ADMIN — LORAZEPAM 0.5 MG: 0.5 TABLET ORAL at 01:34

## 2022-06-10 RX ADMIN — FAMOTIDINE 20 MG: 10 INJECTION INTRAVENOUS at 03:03

## 2022-06-10 NOTE — ED TRIAGE NOTES
Patient anxious on arrival, states she took a cbd gummy,  Feels like her heart is racing. Pt states she also has not felt good for 2 weeks.   States yellow stuff coming out of nose

## 2022-06-10 NOTE — ED PROVIDER NOTES
EMERGENCY DEPARTMENT HISTORY AND PHYSICAL EXAM    Date: 6/10/2022  Patient Name: Juanito Soliman    History of Presenting Illness     Chief Complaint   Patient presents with    Palpitations         History Provided By: Patient    Chief Complaint: anxiety chest pain and tightness   Duration:few hrs   Timing: acute   Location: chest   Quality: tightness  Severity: moderate  Modifying Factors: started after taking CBD gummy    Associated Symptoms: anxiety nausea palpitations chest tightness chest pain sinus pain and pressure, rhinorrhea (URI sx x 2 weeks)      Additional History (Context): Juanito Soliman is a 31 y.o. female with PMH asthma who presents with c/o cute onset chest tightness, chest pain, palpitations, and anxiety that began after taking a CBD gummy earlier this evening. Patient states she pulled a gummy from a local gas station. She states she only took 1 as directed on the package. Patient denies taking any medications or using any other substances following his gummy. Also reports sinus pain, congestion, and sinus pressure with green nasal drainage for the past 2 weeks. Patient states she took a home COVID test which was negative. No other complaints are reported at this time. PCP: None    Current Facility-Administered Medications   Medication Dose Route Frequency Provider Last Rate Last Admin    famotidine (PF) (PEPCID) 20 mg in 0.9% sodium chloride 10 mL injection  20 mg IntraVENous NOW Neela Willson PA-C        mylanta/viscous lidocaine (GI COCKTAIL)  40 mL Oral ONCE Neela Willson [place], [place]         Current Outpatient Medications   Medication Sig Dispense Refill    amoxicillin-clavulanate (Augmentin) 875-125 mg per tablet Take 1 Tablet by mouth two (2) times a day.  20 Tablet 0       Past History     Past Medical History:  Past Medical History:   Diagnosis Date    Asthma     GBS carrier        Past Surgical History:  Past Surgical History:   Procedure Laterality Date    HX  SECTION  09/2016       Family History:  Family History   Problem Relation Age of Onset    Hypertension Mother     Hypertension Father        Social History:  Social History     Tobacco Use    Smoking status: Former Smoker    Smokeless tobacco: Not on file   Substance Use Topics    Alcohol use: Yes    Drug use: Not on file       Allergies: Allergies   Allergen Reactions    Sudafed [Pseudoephedrine Hcl] Hives         Review of Systems   Review of Systems   Constitutional: Negative. Negative for chills and fever. HENT: Positive for rhinorrhea, sinus pressure and sinus pain. Negative for congestion and ear pain. Eyes: Negative. Negative for pain and redness. Respiratory: Positive for chest tightness. Negative for cough, shortness of breath, wheezing and stridor. Cardiovascular: Positive for chest pain and palpitations. Negative for leg swelling. Gastrointestinal: Positive for nausea. Negative for abdominal pain, constipation, diarrhea and vomiting. Genitourinary: Negative. Negative for dysuria and frequency. Musculoskeletal: Negative. Negative for back pain and neck pain. Skin: Negative. Negative for rash and wound. Neurological: Negative. Negative for dizziness, seizures, syncope and headaches. Psychiatric/Behavioral: The patient is nervous/anxious. All other systems reviewed and are negative. All Other Systems Negative  Physical Exam     Vitals:    06/10/22 0035   BP: 135/89   Pulse: (!) 103   Resp: 18   Temp: 98.4 °F (36.9 °C)   SpO2: 96%   Weight: 68 kg (150 lb)   Height: 5' 6\" (1.676 m)     Physical Exam  Vitals and nursing note reviewed. Constitutional:       General: She is in acute distress. Appearance: She is well-developed. She is not ill-appearing or diaphoretic. Comments: Slightly anxious appearing    HENT:      Head: Normocephalic and atraumatic.       Nose:      Comments: TTP noted over the bilateral maxillary sinuses     Mouth/Throat:      Mouth: Mucous membranes are moist.   Eyes:      General: No scleral icterus. Right eye: No discharge. Left eye: No discharge. Conjunctiva/sclera: Conjunctivae normal.   Cardiovascular:      Rate and Rhythm: Normal rate and regular rhythm. Heart sounds: Normal heart sounds. No murmur heard. No friction rub. No gallop. Pulmonary:      Effort: Pulmonary effort is normal. No respiratory distress. Breath sounds: Normal breath sounds. No stridor. No wheezing, rhonchi or rales. Chest:      Chest wall: No tenderness. Musculoskeletal:         General: Normal range of motion. Cervical back: Normal range of motion and neck supple. Skin:     General: Skin is warm and dry. Findings: No erythema or rash. Neurological:      General: No focal deficit present. Mental Status: She is alert and oriented to person, place, and time. Mental status is at baseline. Coordination: Coordination normal.      Comments: Gait is steady and patient exhibits no evidence of ataxia. Patient is able to ambulate without difficulty. No focal neurological deficit noted.  No facial droop, slurred speech, or evidence of altered mentation noted on exam.     Psychiatric:      Comments: Anxious appearing                 Diagnostic Study Results     Labs -     Recent Results (from the past 12 hour(s))   EKG, 12 LEAD, INITIAL    Collection Time: 06/10/22 12:39 AM   Result Value Ref Range    Ventricular Rate 105 BPM    Atrial Rate 105 BPM    P-R Interval 152 ms    QRS Duration 80 ms    Q-T Interval 334 ms    QTC Calculation (Bezet) 441 ms    Calculated P Axis 72 degrees    Calculated R Axis 89 degrees    Calculated T Axis 41 degrees    Diagnosis       Sinus tachycardia  Otherwise normal ECG  No previous ECGs available     URINALYSIS W/ RFLX MICROSCOPIC    Collection Time: 06/10/22  1:23 AM   Result Value Ref Range    Color YELLOW      Appearance CLEAR      Specific gravity 1.009 1.005 - 1.030      pH (UA) 6.0 5.0 - 8.0      Protein Negative NEG mg/dL    Glucose Negative NEG mg/dL    Ketone Negative NEG mg/dL    Bilirubin Negative NEG      Blood Negative NEG      Urobilinogen 0.2 0.2 - 1.0 EU/dL    Nitrites Negative NEG      Leukocyte Esterase Negative NEG     HCG URINE, QL    Collection Time: 06/10/22  1:23 AM   Result Value Ref Range    HCG urine, QL Negative NEG         Radiologic Studies -   XR CHEST PORT    (Results Pending)     CT Results  (Last 48 hours)    None        CXR Results  (Last 48 hours)    None            Medical Decision Making   I am the first provider for this patient. I reviewed the vital signs, available nursing notes, past medical history, past surgical history, family history and social history. Vital Signs-Reviewed the patient's vital signs. Records Reviewed:  Neela Willson PA-C     Procedures:  Procedures    Provider Notes (Medical Decision Making): Impression:  Anxiety state, palpitations, chest tightness, sinusitis     Ativan and ODT zofran given in the ED     EKG sinus tachycardia, rate 105, no STEMI or acute ST changes, reviewed by myself and the ED attending. Neela Willson PA-C     Chest x-ray negative for acute process   UA pending   hcg negative    Pt still complaining of pain and states she is now having stomach pain. IV insertion, labs, pepcid and GI cocktail ordered. Will turn over to night ED attending Dr. Jen Watts who will follow-up with this pt. Neela Willson PA-C      MED RECONCILIATION:  Current Facility-Administered Medications   Medication Dose Route Frequency    famotidine (PF) (PEPCID) 20 mg in 0.9% sodium chloride 10 mL injection  20 mg IntraVENous NOW    mylanta/viscous lidocaine (GI COCKTAIL)  40 mL Oral ONCE     Current Outpatient Medications   Medication Sig    amoxicillin-clavulanate (Augmentin) 875-125 mg per tablet Take 1 Tablet by mouth two (2) times a day.        Disposition:  TBD        Follow-up Information     Follow up With Specialties Details Why [address]  In 1 week  [address]  [facility] [address] [facility] EMERGENCY DEPT Emergency Medicine  As needed, If symptoms worsen [address] [facility] [address]  [phone]          Current Discharge Medication List      START taking these medications    Details   amoxicillin-clavulanate (Augmentin) 875-125 mg per tablet Take 1 Tablet by mouth two (2) times a day. Qty: 20 Tablet, Refills: 0  Start date: 6/10/2022             Diagnosis     Clinical Impression:   1. Palpitations    2. Anxiety state    3. Chest tightness    4.  Acute non-recurrent sinusitis, unspecified location

## 2022-06-10 NOTE — DISCHARGE INSTRUCTIONS
[employer] Activation    Thank you for requesting access to [employer] Please follow the instructions below to securely access and download your online medical record. [employer] allows you to send messages to your doctor, view your test results, renew your prescriptions, schedule appointments, and more. How Do I Sign Up? In your internet browser, go to [email or web address]  Click on the First Time User? Click Here link in the Sign In box. You will be redirect to the New Member Sign Up page. Enter your [employer] Access Code exactly as it appears below. You will not need to use this code after youve completed the sign-up process. If you do not sign up before the expiration date, you must request a new code. [employer] Access Code: [email or web address]  Expires: 2022 12:32 AM (This is the date your [employer] access code will )    Enter the last four digits of your Social Security Number (xxxx) and Date of Birth (mm/dd/yyyy) as indicated and click Submit. You will be taken to the next sign-up page. Create a [employer] ID. This will be your [employer] login ID and cannot be changed, so think of one that is secure and easy to remember. Create a [employer] password. You can change your password at any time. Enter your Password Reset Question and Answer. This can be used at a later time if you forget your password. Enter your e-mail address. You will receive e-mail notification when new information is available in [address] Click Sign Up. You can now view and download portions of your medical record. Click the [place] link to download a portable copy of your medical information. Additional Information    If you have questions, please visit the Frequently Asked Questions section of the [employer] website at [email or web address] Remember, [employer] is NOT to be used for urgent needs. For medical emergencies, dial 911.